# Patient Record
Sex: FEMALE | Race: WHITE | NOT HISPANIC OR LATINO | Employment: OTHER | URBAN - METROPOLITAN AREA
[De-identification: names, ages, dates, MRNs, and addresses within clinical notes are randomized per-mention and may not be internally consistent; named-entity substitution may affect disease eponyms.]

---

## 2017-01-06 RX ORDER — LISINOPRIL 40 MG/1
TABLET ORAL
Qty: 90 TABLET | Refills: 0 | Status: SHIPPED | OUTPATIENT
Start: 2017-01-06 | End: 2017-04-03 | Stop reason: SDUPTHER

## 2017-01-06 RX ORDER — ISOSORBIDE MONONITRATE 60 MG/1
TABLET, EXTENDED RELEASE ORAL
Qty: 90 TABLET | Refills: 0 | Status: SHIPPED | OUTPATIENT
Start: 2017-01-06 | End: 2017-04-03 | Stop reason: SDUPTHER

## 2017-02-17 DIAGNOSIS — E11.9 DIABETES MELLITUS TYPE 2, UNCOMPLICATED (HCC): ICD-10-CM

## 2017-02-17 RX ORDER — GLIPIZIDE 2.5 MG/1
TABLET, EXTENDED RELEASE ORAL
Qty: 60 TABLET | Refills: 1 | Status: SHIPPED | OUTPATIENT
Start: 2017-02-17 | End: 2018-04-16 | Stop reason: ALTCHOICE

## 2017-03-20 ENCOUNTER — OFFICE VISIT (OUTPATIENT)
Dept: CARDIOLOGY | Facility: CLINIC | Age: 67
End: 2017-03-20

## 2017-03-20 VITALS
DIASTOLIC BLOOD PRESSURE: 78 MMHG | WEIGHT: 184.2 LBS | HEART RATE: 45 BPM | SYSTOLIC BLOOD PRESSURE: 136 MMHG | BODY MASS INDEX: 33.9 KG/M2 | HEIGHT: 62 IN

## 2017-03-20 DIAGNOSIS — I25.5 ISCHEMIC CARDIOMYOPATHY: ICD-10-CM

## 2017-03-20 DIAGNOSIS — I44.7 LEFT BUNDLE BRANCH BLOCK (LBBB): ICD-10-CM

## 2017-03-20 DIAGNOSIS — I25.118 CORONARY ARTERY DISEASE OF NATIVE ARTERY OF NATIVE HEART WITH STABLE ANGINA PECTORIS (HCC): Primary | ICD-10-CM

## 2017-03-20 DIAGNOSIS — I65.23 BILATERAL CAROTID ARTERY STENOSIS: ICD-10-CM

## 2017-03-20 DIAGNOSIS — R00.1 SINUS BRADYCARDIA: ICD-10-CM

## 2017-03-20 DIAGNOSIS — E11.9 TYPE 2 DIABETES MELLITUS WITHOUT COMPLICATION, WITHOUT LONG-TERM CURRENT USE OF INSULIN (HCC): ICD-10-CM

## 2017-03-20 DIAGNOSIS — I10 ESSENTIAL HYPERTENSION: ICD-10-CM

## 2017-03-20 PROCEDURE — 93000 ELECTROCARDIOGRAM COMPLETE: CPT | Performed by: INTERNAL MEDICINE

## 2017-03-20 PROCEDURE — 99213 OFFICE O/P EST LOW 20 MIN: CPT | Performed by: INTERNAL MEDICINE

## 2017-03-20 RX ORDER — ATORVASTATIN CALCIUM 20 MG/1
TABLET, FILM COATED ORAL
COMMUNITY
Start: 2016-12-12

## 2017-03-20 NOTE — PROGRESS NOTES
Date of Office Visit: 2017  Encounter Provider: Sherwin Robertson MD  Place of Service: [facility] CARDIOLOGY  Patient Name: Petra Soto  :1950    Chief Complaint   Patient presents with   • Coronary Artery Disease   :     HPI: Petra Soto is a 66 y.o. female who presents today to follow up.  In , she suffered a myocardial infarction. Presumably this was due to occlusion of the left anterior descending artery. She underwent angioplasty and stenting but this failed and she then underwent coronary artery bypass grafting with a LIMA to the diagonal and a saphenous vein graft to the LAD. In , she underwent cardiac catheterization which revealed complete occlusion of the LAD as well as complete occlusion of the saphenous vein graft to the LAD. All other vessels were normal and her LIMA to the diagonal was normal as well. She has mild ischemic cardiomyopathy with an ejection fraction of 45%. She has a long standing left bundle branch block. She has chronic stable angina which is CCS class II.  Her pattern of angina has not increased in severity, frequency or quality.    When I review her old records from [place], she also has a long history of sinus bradycardia.  There is a report that states a PET was done in , but I don't see a report.  She has a reported history of a false positive Cardiolite/Myoview.    Past Medical History   Diagnosis Date   • Carotid atherosclerosis      nonosbstructive, minimal by US 3/2016 (<15% bilaterally)   • Coronary artery disease      MI , s/p PCI-LAD, f/b near immediate CABG (LIMA-diag, SVG-LAD).  Cath  with normal LM/LCx/RCA,  LAD,  SVG-LAD, patent LIMA-diag, R-L collaterals   • Diabetes mellitus    • Essential hypertension    • Hyperlipidemia    • Ischemic cardiomyopathy      mild, EF 45%   • Left bundle branch block    • Multiple URI    • Stable angina        Past Surgical History   Procedure Laterality Date   •  Hysterectomy     • Cardiac surgery       Bypass.   • Submandibular gland excision     • Coronary artery bypass graft     • Peripheral arterial stent graft         Social History     Social History   • Marital status:      Spouse name: N/A   • Number of children: N/A   • Years of education: N/A     Occupational History   • Not on file.     Social History Main Topics   • Smoking status: Never Smoker   • Smokeless tobacco: Never Used   • Alcohol use No   • Drug use: No   • Sexual activity: Not on file     Other Topics Concern   • Not on file     Social History Narrative       Family History   Problem Relation Age of Onset   • Colon cancer Mother    • Stomach cancer Mother    • Heart disease Father    • Heart failure Father    • Hypertension Sister    • Thyroid disease Sister    • Hypertension Brother        Review of Systems   HENT: Positive for hearing loss.    Cardiovascular: Positive for chest pain.   All other systems reviewed and are negative.      Allergies   Allergen Reactions   • Morphine And Related Nausea And Vomiting   • Oxycodone Nausea And Vomiting         Current Outpatient Prescriptions:   •  amLODIPine (NORVASC) 5 MG tablet, Take 1 tablet by mouth Daily., Disp: 90 tablet, Rfl: 1  •  aspirin (KATHARINE ASPIRIN) 325 MG tablet, Take 1 tablet (325 mg total) by mouth daily., Disp: 30 tablet, Rfl: 0  •  atorvastatin (LIPITOR) 20 MG tablet, TAKE ONE TABLET BY MOUTH DAILY, Disp: , Rfl:   •  Canagliflozin (INVOKANA) 100 MG tablet, Take 100 mg by mouth Daily., Disp: 20 tablet, Rfl: 0  •  GLIPIZIDE XL 2.5 MG 24 hr tablet, TAKE ONE TO TWO TABLETS BY MOUTH EVERY MORNING MONITOR FOR HYPOGLYCEMIA, Disp: 60 tablet, Rfl: 1  •  glucose blood test strip, Test Blood Sugar two to three times a day as directed , Diagnosis E11.9, Accu-Check Smartview Test Strips, Disp: 300 each, Rfl: 1  •  hydrochlorothiazide (HYDRODIURIL) 25 MG tablet, Take 1/2 PO Every Other Day., Disp: 30 tablet, Rfl: 2  •  isosorbide mononitrate  "(IMDUR) 60 MG 24 hr tablet, TAKE ONE TABLET BY MOUTH DAILY, Disp: 90 tablet, Rfl: 0  •  lisinopril (PRINIVIL,ZESTRIL) 40 MG tablet, TAKE ONE TABLET BY MOUTH DAILY, Disp: 90 tablet, Rfl: 0  •  metFORMIN (GLUCOPHAGE) 1000 MG tablet, TAKE ONE TABLET BY MOUTH TWICE A DAY WITH MEALS, Disp: 60 tablet, Rfl: 1  •  metoprolol succinate XL (TOPROL XL) 50 MG 24 hr tablet, Take 1 tablet by mouth Daily., Disp: 90 tablet, Rfl: 1  •  multivitamin (THERAGRAN) tablet tablet, Take 1 tablet by mouth daily., Disp: 30 tablet, Rfl: 0  •  nitroglycerin (NITROSTAT) 0.4 MG SL tablet, Place 1 tablet under the tongue Every 5 (Five) Minutes As Needed for chest pain. Take no more than 3 doses in 15 minutes., Disp: 30 tablet, Rfl: 0  •  SITagliptin (JANUVIA) 100 MG tablet, Take 1 tablet by mouth Daily., Disp: 28 tablet, Rfl: 0     Objective:     Vitals:    03/20/17 1041   BP: 136/78   Pulse: (!) 45   Weight: 184 lb 3.2 oz (83.6 kg)   Height: 62\" (157.5 cm)     Body mass index is 33.69 kg/(m^2).    Physical Exam   Constitutional: She is oriented to person, place, and time.   Obese   HENT:   Head: Normocephalic.   Nose: Nose normal.   Mouth/Throat: Oropharynx is clear and moist.   Eyes: Conjunctivae and EOM are normal. Pupils are equal, round, and reactive to light.   Neck: Normal range of motion.   Cannot assess for JVD due to habitus   Cardiovascular: Normal rate, regular rhythm, normal heart sounds and intact distal pulses.    No murmur heard.  Pulmonary/Chest: Effort normal.   Abdominal: Soft.   Obesity limits abdominal exam   Musculoskeletal: Normal range of motion. She exhibits no edema.   Neurological: She is alert and oriented to person, place, and time. No cranial nerve deficit.   Skin: Skin is warm and dry. No rash noted.   Psychiatric: She has a normal mood and affect. Her behavior is normal. Judgment and thought content normal.   Vitals reviewed.        ECG 12 Lead  Date/Time: 3/20/2017 3:23 PM  Performed by: RJ LUCAS " by: SHERWIN ROBERTSON   Comparison: compared with previous ECG   Similar to previous ECG  Rhythm: sinus bradycardia  Conduction: left bundle branch block  Other: no other findings  Clinical impression: abnormal ECG              Assessment:       Diagnosis Plan   1. Coronary artery disease of native artery of native heart with stable angina pectoris     2. Ischemic cardiomyopathy     3. Left bundle branch block (LBBB)     4. Sinus bradycardia     5. Essential hypertension     6. Bilateral carotid artery stenosis     7. Type 2 diabetes mellitus without complication, without long-term current use of insulin            Plan:       1.  Coronary Artery Disease  Assessment  • The patient has CCS class II - angina only during vigorous physical activity  • The patient has stable angina   • Continue atorvastatin    Subjective - Objective  • There is a history of previous coronary artery bypass graft 1999  • Current antiplatelet therapy includes aspirin 81 mg      2/3.  She has mild ICM without CHF.  Her LVEF is 45%.  Her LBBB is old.    4.  She has a long history of sinus bradycardia but is tolerating it well.  I will continue her metoprolol.    5.  Her BP is within goal.    6.  A carotid doppler in 2016 showed < 15% carotid disease bilaterally. She's medically treated.  A repeat doppler will be done in 2018.      Sincerely,       Sherwin Robertson MD

## 2017-04-03 RX ORDER — ISOSORBIDE MONONITRATE 60 MG/1
TABLET, EXTENDED RELEASE ORAL
Qty: 90 TABLET | Refills: 0 | Status: SHIPPED | OUTPATIENT
Start: 2017-04-03

## 2017-04-03 RX ORDER — LISINOPRIL 40 MG/1
TABLET ORAL
Qty: 90 TABLET | Refills: 0 | Status: SHIPPED | OUTPATIENT
Start: 2017-04-03

## 2017-05-26 RX ORDER — NITROGLYCERIN 0.4 MG/1
TABLET SUBLINGUAL
Qty: 25 TABLET | Refills: 0 | OUTPATIENT
Start: 2017-05-26

## 2017-06-27 RX ORDER — ISOSORBIDE MONONITRATE 60 MG/1
TABLET, EXTENDED RELEASE ORAL
Qty: 30 TABLET | Refills: 0 | OUTPATIENT
Start: 2017-06-27

## 2017-07-03 RX ORDER — LISINOPRIL 40 MG/1
TABLET ORAL
Qty: 30 TABLET | Refills: 0 | OUTPATIENT
Start: 2017-07-03

## 2017-10-04 RX ORDER — HYDROCHLOROTHIAZIDE 25 MG/1
TABLET ORAL
Qty: 30 TABLET | Refills: 1 | OUTPATIENT
Start: 2017-10-04

## 2018-01-02 RX ORDER — METOPROLOL SUCCINATE 50 MG/1
TABLET, EXTENDED RELEASE ORAL
Qty: 30 TABLET | Refills: 0 | OUTPATIENT
Start: 2018-01-02

## 2018-01-08 RX ORDER — METOPROLOL SUCCINATE 50 MG/1
TABLET, EXTENDED RELEASE ORAL
Qty: 30 TABLET | Refills: 0 | OUTPATIENT
Start: 2018-01-08

## 2018-04-16 ENCOUNTER — OFFICE VISIT (OUTPATIENT)
Dept: CARDIOLOGY | Facility: CLINIC | Age: 68
End: 2018-04-16

## 2018-04-16 VITALS
HEART RATE: 45 BPM | BODY MASS INDEX: 35.96 KG/M2 | WEIGHT: 195.4 LBS | SYSTOLIC BLOOD PRESSURE: 128 MMHG | HEIGHT: 62 IN | DIASTOLIC BLOOD PRESSURE: 78 MMHG

## 2018-04-16 DIAGNOSIS — R01.1 HEART MURMUR: ICD-10-CM

## 2018-04-16 DIAGNOSIS — I25.5 ISCHEMIC CARDIOMYOPATHY: ICD-10-CM

## 2018-04-16 DIAGNOSIS — I25.708 CORONARY ARTERY DISEASE OF BYPASS GRAFT OF NATIVE HEART WITH STABLE ANGINA PECTORIS (HCC): Primary | ICD-10-CM

## 2018-04-16 DIAGNOSIS — I65.23 BILATERAL CAROTID ARTERY STENOSIS: ICD-10-CM

## 2018-04-16 DIAGNOSIS — R00.1 SINUS BRADYCARDIA: ICD-10-CM

## 2018-04-16 DIAGNOSIS — I10 ESSENTIAL HYPERTENSION: ICD-10-CM

## 2018-04-16 DIAGNOSIS — I44.7 LEFT BUNDLE BRANCH BLOCK (LBBB): ICD-10-CM

## 2018-04-16 PROCEDURE — 93000 ELECTROCARDIOGRAM COMPLETE: CPT | Performed by: INTERNAL MEDICINE

## 2018-04-16 PROCEDURE — 99214 OFFICE O/P EST MOD 30 MIN: CPT | Performed by: INTERNAL MEDICINE

## 2018-04-16 RX ORDER — GLIPIZIDE 10 MG/1
10 TABLET ORAL 2 TIMES DAILY
COMMUNITY

## 2018-04-16 RX ORDER — ESTRADIOL 0.1 MG/G
CREAM VAGINAL 3 TIMES WEEKLY
COMMUNITY

## 2018-04-16 NOTE — PROGRESS NOTES
Date of Office Visit: 2018  Encounter Provider: Sherwin Robertson MD  Place of Service: [facility] CARDIOLOGY  Patient Name: Petra Soto  :1950    Chief Complaint   Patient presents with   • Coronary Artery Disease   :     HPI: Petra Soto is a 67 y.o. female who presents today to follow up.  In , she suffered a myocardial infarction. Presumably this was due to occlusion of the left anterior descending artery. She underwent angioplasty and stenting but this failed and she then underwent coronary artery bypass grafting with a LIMA to the diagonal and a saphenous vein graft to the LAD. In , she underwent cardiac catheterization which revealed complete occlusion of the LAD as well as complete occlusion of the saphenous vein graft to the LAD. All other vessels were normal and her LIMA to the diagonal was normal as well. She has mild ischemic cardiomyopathy with an ejection fraction of 45%. She has a long standing left bundle branch block. She has chronic stable angina which is CCS class II.  Her pattern of angina has not increased in severity, frequency or quality.  There was a note from [place] that states a PET was done in , but I don't see a report.    She also has a long history of sinus bradycardia.   She checks her vitals at home and her pulse is usually 45-55 bpm, and her systolic pressure is in the 120s.      Past Medical History:   Diagnosis Date   • Carotid atherosclerosis     nonosbstructive, minimal by US 3/2016 (<15% bilaterally)   • Coronary artery disease     MI , s/p PCI-LAD vs diagonal (3*13mm Duet), f/b near immediate CABG (LIMA-diag, SVG-LAD).  Cath  with normal LM/LCx/RCA,  LAD,  SVG-LAD, patent LIMA-diag, R-L collaterals   • Diabetes mellitus    • Essential hypertension    • Hyperlipidemia    • Ischemic cardiomyopathy     mild, EF 45%   • Left bundle branch block    • Multiple URI    • Stable angina        Past Surgical History:  "  Procedure Laterality Date   • BLADDER SURGERY  12/2017   • CARDIAC SURGERY      Bypass.   • CATARACT EXTRACTION, BILATERAL     • CORONARY ARTERY BYPASS GRAFT     • HYSTERECTOMY     • PERIPHERAL ARTERIAL STENT GRAFT     • SUBMANDIBULAR GLAND EXCISION         Social History     Social History   • Marital status:      Spouse name: N/A   • Number of children: N/A   • Years of education: N/A     Occupational History   • Not on file.     Social History Main Topics   • Smoking status: Never Smoker   • Smokeless tobacco: Never Used      Comment: caffeine use: 'not often\"   • Alcohol use No   • Drug use: No   • Sexual activity: Not on file     Other Topics Concern   • Not on file     Social History Narrative   • No narrative on file       Family History   Problem Relation Age of Onset   • Colon cancer Mother    • Stomach cancer Mother    • Heart disease Father    • Heart failure Father    • Hypertension Sister    • Thyroid disease Sister    • Hypertension Brother        Review of Systems   Constitution: Negative for malaise/fatigue.   HENT: Positive for hearing loss.    Cardiovascular: Positive for chest pain. Negative for irregular heartbeat and leg swelling.   Respiratory: Negative for shortness of breath.    Neurological: Negative for dizziness and light-headedness.   All other systems reviewed and are negative.      Allergies   Allergen Reactions   • Morphine And Related Nausea And Vomiting   • Oxycodone Nausea And Vomiting         Current Outpatient Prescriptions:   •  amLODIPine (NORVASC) 5 MG tablet, Take 1 tablet by mouth Daily., Disp: 90 tablet, Rfl: 1  •  aspirin (KATHARINE ASPIRIN) 325 MG tablet, Take 1 tablet (325 mg total) by mouth daily., Disp: 30 tablet, Rfl: 0  •  atorvastatin (LIPITOR) 20 MG tablet, TAKE ONE TABLET BY MOUTH DAILY, Disp: , Rfl:   •  estradiol (ESTRACE) 0.1 MG/GM vaginal cream, Insert 2 g into the vagina Daily., Disp: , Rfl:   •  glipiZIDE (GLUCOTROL) 10 MG tablet, Take 10 mg by mouth " "Daily., Disp: , Rfl:   •  glucose blood test strip, Test Blood Sugar two to three times a day as directed , Diagnosis E11.9, Accu-Check Smartview Test Strips, Disp: 300 each, Rfl: 1  •  hydrochlorothiazide (HYDRODIURIL) 25 MG tablet, Take 1/2 PO Every Other Day., Disp: 30 tablet, Rfl: 2  •  isosorbide mononitrate (IMDUR) 60 MG 24 hr tablet, TAKE ONE TABLET BY MOUTH DAILY, Disp: 90 tablet, Rfl: 0  •  lisinopril (PRINIVIL,ZESTRIL) 40 MG tablet, TAKE ONE TABLET BY MOUTH DAILY, Disp: 90 tablet, Rfl: 0  •  metFORMIN (GLUCOPHAGE) 1000 MG tablet, TAKE ONE TABLET BY MOUTH TWICE A DAY WITH MEALS, Disp: 60 tablet, Rfl: 1  •  metoprolol succinate XL (TOPROL XL) 50 MG 24 hr tablet, Take 1 tablet by mouth Daily., Disp: 90 tablet, Rfl: 1  •  multivitamin (THERAGRAN) tablet tablet, Take 1 tablet by mouth daily., Disp: 30 tablet, Rfl: 0  •  nitroglycerin (NITROSTAT) 0.4 MG SL tablet, Place 1 tablet under the tongue Every 5 (Five) Minutes As Needed for chest pain. Take no more than 3 doses in 15 minutes., Disp: 30 tablet, Rfl: 0     Objective:     Vitals:    04/16/18 1012 04/16/18 1027   BP: 138/82 128/78   BP Location: Left arm    Pulse: (!) 45    Weight: 88.6 kg (195 lb 6.4 oz)    Height: 157.5 cm (62\")      Body mass index is 35.74 kg/m².    Physical Exam   Constitutional: She is oriented to person, place, and time.   Obese   HENT:   Head: Normocephalic.   Nose: Nose normal.   Mouth/Throat: Oropharynx is clear and moist.   Eyes: Conjunctivae and EOM are normal. Pupils are equal, round, and reactive to light.   Neck: Normal range of motion.   Cannot assess for JVD due to habitus   Cardiovascular: Normal rate, regular rhythm and intact distal pulses.    Murmur heard.   Systolic murmur is present with a grade of 2/6  at the upper right sternal border  Pulmonary/Chest: Effort normal.   Abdominal: Soft.   Obesity limits abdominal exam   Musculoskeletal: Normal range of motion. She exhibits no edema.   Neurological: She is alert and " oriented to person, place, and time. No cranial nerve deficit.   Skin: Skin is warm and dry. No rash noted.   Psychiatric: She has a normal mood and affect. Her behavior is normal. Judgment and thought content normal.   Vitals reviewed.        ECG 12 Lead  Date/Time: 4/16/2018 10:30 AM  Performed by: SHERWIN ROBERTSON  Authorized by: SHERWIN ROBERTSON   Comparison: compared with previous ECG   Similar to previous ECG  Rhythm: sinus bradycardia  Conduction: left bundle branch block  Other: no other findings  Clinical impression: abnormal ECG              Assessment:       Diagnosis Plan   1. Coronary artery disease of bypass graft of native heart with stable angina pectoris     2. Ischemic cardiomyopathy  Adult Transthoracic Echo Complete W/ Cont if Necessary Per Protocol   3. Sinus bradycardia     4. Left bundle branch block (LBBB)     5. Essential hypertension     6. Bilateral carotid artery stenosis  Duplex Carotid Ultrasound CAR   7. Heart murmur            Plan:       1.  Coronary Artery Disease  Assessment  • The patient has CCS class II - angina only during vigorous physical activity  • The patient has stable angina   • Continue atorvastatin.    Subjective - Objective  • There is a history of previous coronary artery bypass graft 1999  • Current antiplatelet therapy includes aspirin 81 mg      2.  She has mild ICM without CHF.  Her LVEF is 45%.      3/4.  She has a long standing LBBB and known sinus bradycardia.  As she is tolerasting it well, no changes were made.     5.  Her BP is within goal.    6.  A carotid doppler in 2016 showed < 15% carotid disease bilaterally. She's medically treated.  A repeat doppler will be performed.    7.  She has a RUSB murmur I haven't noted before.  I suspect this is aortic sclerosis; I'll check an echocardiogram.     Sincerely,       Sherwin Robertson MD

## 2018-04-25 ENCOUNTER — HOSPITAL ENCOUNTER (OUTPATIENT)
Dept: CARDIOLOGY | Facility: HOSPITAL | Age: 68
Discharge: HOME | End: 2018-04-25
Attending: INTERNAL MEDICINE | Admitting: INTERNAL MEDICINE

## 2018-04-25 ENCOUNTER — HOSPITAL ENCOUNTER (OUTPATIENT)
Dept: CARDIOLOGY | Facility: HOSPITAL | Age: 68
Discharge: HOME | End: 2018-04-25
Attending: INTERNAL MEDICINE

## 2018-04-25 VITALS
HEART RATE: 43 BPM | HEIGHT: 62 IN | DIASTOLIC BLOOD PRESSURE: 70 MMHG | SYSTOLIC BLOOD PRESSURE: 128 MMHG | WEIGHT: 195 LBS | BODY MASS INDEX: 35.88 KG/M2

## 2018-04-25 DIAGNOSIS — I65.23 BILATERAL CAROTID ARTERY STENOSIS: ICD-10-CM

## 2018-04-25 DIAGNOSIS — I25.5 ISCHEMIC CARDIOMYOPATHY: ICD-10-CM

## 2018-04-25 PROCEDURE — 93880 EXTRACRANIAL BILAT STUDY: CPT | Performed by: INTERNAL MEDICINE

## 2018-04-25 PROCEDURE — 93306 TTE W/DOPPLER COMPLETE: CPT

## 2018-04-25 PROCEDURE — 25010000002 PERFLUTREN (DEFINITY) 8.476 MG IN SODIUM CHLORIDE 0.9 % 10 ML INJECTION: Performed by: INTERNAL MEDICINE

## 2018-04-25 PROCEDURE — 0399T HC MYOCARDL STRAIN IMAG QUAN ASSMT PER SESS: CPT

## 2018-04-25 PROCEDURE — 93880 EXTRACRANIAL BILAT STUDY: CPT

## 2018-04-25 PROCEDURE — 0399T ADULT TRANSTHORACIC ECHO COMPLETE W/ CONT IF NECESSARY PER PROTOCOL: CPT | Performed by: INTERNAL MEDICINE

## 2018-04-25 PROCEDURE — 93306 TTE W/DOPPLER COMPLETE: CPT | Performed by: INTERNAL MEDICINE

## 2018-04-25 RX ADMIN — PERFLUTREN 1.5 ML: 6.52 INJECTION, SUSPENSION INTRAVENOUS at 09:31

## 2018-04-26 LAB
BH CV ECHO MEAS - ACS: 2.1 CM
BH CV ECHO MEAS - AO MAX PG (FULL): 4.5 MMHG
BH CV ECHO MEAS - AO MAX PG: 9.1 MMHG
BH CV ECHO MEAS - AO MEAN PG (FULL): 2.2 MMHG
BH CV ECHO MEAS - AO MEAN PG: 4.2 MMHG
BH CV ECHO MEAS - AO ROOT AREA (BSA CORRECTED): 2
BH CV ECHO MEAS - AO ROOT AREA: 11.1 CM^2
BH CV ECHO MEAS - AO ROOT DIAM: 3.8 CM
BH CV ECHO MEAS - AO V2 MAX: 150.8 CM/SEC
BH CV ECHO MEAS - AO V2 MEAN: 92.6 CM/SEC
BH CV ECHO MEAS - AO V2 VTI: 33.6 CM
BH CV ECHO MEAS - AVA(I,A): 3.2 CM^2
BH CV ECHO MEAS - AVA(I,D): 3.2 CM^2
BH CV ECHO MEAS - AVA(V,A): 3 CM^2
BH CV ECHO MEAS - AVA(V,D): 3 CM^2
BH CV ECHO MEAS - BSA(HAYCOCK): 2 M^2
BH CV ECHO MEAS - BSA: 1.9 M^2
BH CV ECHO MEAS - BZI_BMI: 35.7 KILOGRAMS/M^2
BH CV ECHO MEAS - BZI_METRIC_HEIGHT: 157.5 CM
BH CV ECHO MEAS - BZI_METRIC_WEIGHT: 88.5 KG
BH CV ECHO MEAS - CONTRAST EF (2CH): 42.9 ML/M^2
BH CV ECHO MEAS - CONTRAST EF 4CH: 41.5 ML/M^2
BH CV ECHO MEAS - EDV(MOD-SP2): 98 ML
BH CV ECHO MEAS - EDV(MOD-SP4): 94 ML
BH CV ECHO MEAS - EDV(TEICH): 145.7 ML
BH CV ECHO MEAS - EF(CUBED): 80.6 %
BH CV ECHO MEAS - EF(MOD-BP): 43 %
BH CV ECHO MEAS - EF(MOD-SP2): 42.9 %
BH CV ECHO MEAS - EF(MOD-SP4): 41.5 %
BH CV ECHO MEAS - EF(TEICH): 72.5 %
BH CV ECHO MEAS - ESV(MOD-SP2): 56 ML
BH CV ECHO MEAS - ESV(MOD-SP4): 55 ML
BH CV ECHO MEAS - ESV(TEICH): 40.1 ML
BH CV ECHO MEAS - FS: 42.1 %
BH CV ECHO MEAS - IVS/LVPW: 1.1
BH CV ECHO MEAS - IVSD: 1.2 CM
BH CV ECHO MEAS - LAT PEAK E' VEL: 7 CM/SEC
BH CV ECHO MEAS - LV DIASTOLIC VOL/BSA (35-75): 49.7 ML/M^2
BH CV ECHO MEAS - LV MASS(C)D: 255.2 GRAMS
BH CV ECHO MEAS - LV MASS(C)DI: 135 GRAMS/M^2
BH CV ECHO MEAS - LV MAX PG: 4.6 MMHG
BH CV ECHO MEAS - LV MEAN PG: 2 MMHG
BH CV ECHO MEAS - LV SYSTOLIC VOL/BSA (12-30): 29.1 ML/M^2
BH CV ECHO MEAS - LV V1 MAX: 107.7 CM/SEC
BH CV ECHO MEAS - LV V1 MEAN: 62.7 CM/SEC
BH CV ECHO MEAS - LV V1 VTI: 25.6 CM
BH CV ECHO MEAS - LVIDD: 5.5 CM
BH CV ECHO MEAS - LVIDS: 3.2 CM
BH CV ECHO MEAS - LVLD AP2: 7.4 CM
BH CV ECHO MEAS - LVLD AP4: 7.5 CM
BH CV ECHO MEAS - LVLS AP2: 7.1 CM
BH CV ECHO MEAS - LVLS AP4: 6.7 CM
BH CV ECHO MEAS - LVOT AREA (M): 4.2 CM^2
BH CV ECHO MEAS - LVOT AREA: 4.2 CM^2
BH CV ECHO MEAS - LVOT DIAM: 2.3 CM
BH CV ECHO MEAS - LVPWD: 1.1 CM
BH CV ECHO MEAS - MED PEAK E' VEL: 6 CM/SEC
BH CV ECHO MEAS - MR MAX PG: 42.1 MMHG
BH CV ECHO MEAS - MR MAX VEL: 324.6 CM/SEC
BH CV ECHO MEAS - MV A DUR: 0.11 SEC
BH CV ECHO MEAS - MV A MAX VEL: 94.6 CM/SEC
BH CV ECHO MEAS - MV DEC SLOPE: 277.5 CM/SEC^2
BH CV ECHO MEAS - MV DEC TIME: 0.26 SEC
BH CV ECHO MEAS - MV E MAX VEL: 58.7 CM/SEC
BH CV ECHO MEAS - MV E/A: 0.62
BH CV ECHO MEAS - MV MAX PG: 4.8 MMHG
BH CV ECHO MEAS - MV MEAN PG: 1.5 MMHG
BH CV ECHO MEAS - MV P1/2T MAX VEL: 69.8 CM/SEC
BH CV ECHO MEAS - MV P1/2T: 73.7 MSEC
BH CV ECHO MEAS - MV V2 MAX: 109.8 CM/SEC
BH CV ECHO MEAS - MV V2 MEAN: 56.2 CM/SEC
BH CV ECHO MEAS - MV V2 VTI: 42.7 CM
BH CV ECHO MEAS - MVA P1/2T LCG: 3.1 CM^2
BH CV ECHO MEAS - MVA(P1/2T): 3 CM^2
BH CV ECHO MEAS - MVA(VTI): 2.5 CM^2
BH CV ECHO MEAS - PA ACC TIME: 0.13 SEC
BH CV ECHO MEAS - PA MAX PG (FULL): 2.8 MMHG
BH CV ECHO MEAS - PA MAX PG: 4.7 MMHG
BH CV ECHO MEAS - PA PR(ACCEL): 18.8 MMHG
BH CV ECHO MEAS - PA V2 MAX: 108.6 CM/SEC
BH CV ECHO MEAS - PULM A REVS DUR: 0.11 SEC
BH CV ECHO MEAS - PULM A REVS VEL: 25.2 CM/SEC
BH CV ECHO MEAS - PULM DIAS VEL: 28.1 CM/SEC
BH CV ECHO MEAS - PULM S/D: 1.6
BH CV ECHO MEAS - PULM SYS VEL: 45.1 CM/SEC
BH CV ECHO MEAS - PVA(V,A): 2.7 CM^2
BH CV ECHO MEAS - PVA(V,D): 2.7 CM^2
BH CV ECHO MEAS - QP/QS: 0.71
BH CV ECHO MEAS - RV MAX PG: 1.9 MMHG
BH CV ECHO MEAS - RV MEAN PG: 1 MMHG
BH CV ECHO MEAS - RV V1 MAX: 69.4 CM/SEC
BH CV ECHO MEAS - RV V1 MEAN: 45.4 CM/SEC
BH CV ECHO MEAS - RV V1 VTI: 17.9 CM
BH CV ECHO MEAS - RVOT AREA: 4.3 CM^2
BH CV ECHO MEAS - RVOT DIAM: 2.3 CM
BH CV ECHO MEAS - SI(AO): 197.7 ML/M^2
BH CV ECHO MEAS - SI(CUBED): 69.8 ML/M^2
BH CV ECHO MEAS - SI(LVOT): 57.4 ML/M^2
BH CV ECHO MEAS - SI(MOD-SP2): 22.2 ML/M^2
BH CV ECHO MEAS - SI(MOD-SP4): 20.6 ML/M^2
BH CV ECHO MEAS - SI(TEICH): 55.9 ML/M^2
BH CV ECHO MEAS - SUP REN AO DIAM: 1.6 CM
BH CV ECHO MEAS - SV(AO): 373.9 ML
BH CV ECHO MEAS - SV(CUBED): 132 ML
BH CV ECHO MEAS - SV(LVOT): 108.6 ML
BH CV ECHO MEAS - SV(MOD-SP2): 42 ML
BH CV ECHO MEAS - SV(MOD-SP4): 39 ML
BH CV ECHO MEAS - SV(RVOT): 77.1 ML
BH CV ECHO MEAS - SV(TEICH): 105.7 ML
BH CV ECHO MEAS - TAPSE (>1.6): 2.4 CM2
BH CV ECHO MEASUREMENTS AVERAGE E/E' RATIO: 9.03
BH CV XLRA - RV BASE: 3.7 CM
BH CV XLRA - TDI S': 10 CM/SEC
LEFT ATRIUM VOLUME INDEX: 21 ML/M2
MAXIMAL PREDICTED HEART RATE: 155 BPM
STRESS TARGET HR: 132 BPM

## 2018-04-27 LAB
BH CV ECHO MEAS - BSA(HAYCOCK): 2 M^2
BH CV ECHO MEAS - BSA: 1.9 M^2
BH CV ECHO MEAS - BZI_BMI: 35.7 KILOGRAMS/M^2
BH CV ECHO MEAS - BZI_METRIC_HEIGHT: 157.5 CM
BH CV ECHO MEAS - BZI_METRIC_WEIGHT: 88.5 KG
BH CV XLRA MEAS LEFT DIST CCA EDV: -16.7 CM/SEC
BH CV XLRA MEAS LEFT DIST CCA PSV: -47.2 CM/SEC
BH CV XLRA MEAS LEFT DIST ICA EDV: -24.3 CM/SEC
BH CV XLRA MEAS LEFT DIST ICA PSV: -44.7 CM/SEC
BH CV XLRA MEAS LEFT ICA/CCA RATIO: 0.9
BH CV XLRA MEAS LEFT MID CCA EDV: -13.3 CM/SEC
BH CV XLRA MEAS LEFT MID CCA PSV: -54.5 CM/SEC
BH CV XLRA MEAS LEFT MID ICA EDV: -24.3 CM/SEC
BH CV XLRA MEAS LEFT MID ICA PSV: -53.5 CM/SEC
BH CV XLRA MEAS LEFT PROX ECA PSV: 57.5 CM/SEC
BH CV XLRA MEAS LEFT PROX ICA EDV: -30 CM/SEC
BH CV XLRA MEAS LEFT PROX ICA PSV: -57.5 CM/SEC
BH CV XLRA MEAS LEFT PROX SCLA PSV: 97.8 CM/SEC
BH CV XLRA MEAS LEFT VERTEBRAL A PSV: -46.7 CM/SEC
BH CV XLRA MEAS RIGHT DIST CCA EDV: -18 CM/SEC
BH CV XLRA MEAS RIGHT DIST CCA PSV: -53.4 CM/SEC
BH CV XLRA MEAS RIGHT ICA/CCA RATIO: 1.8
BH CV XLRA MEAS RIGHT MID CCA EDV: 16.2 CM/SEC
BH CV XLRA MEAS RIGHT MID CCA PSV: 63.4 CM/SEC
BH CV XLRA MEAS RIGHT MID ICA EDV: -9.2 CM/SEC
BH CV XLRA MEAS RIGHT MID ICA PSV: -27.7 CM/SEC
BH CV XLRA MEAS RIGHT PROX ECA PSV: -70.6 CM/SEC
BH CV XLRA MEAS RIGHT PROX ICA EDV: -14.1 CM/SEC
BH CV XLRA MEAS RIGHT PROX ICA PSV: -36.1 CM/SEC
BH CV XLRA MEAS RIGHT PROX SCLA PSV: 99.1 CM/SEC
BH CV XLRA MEAS RIGHT VERTEBRAL A PSV: -23.6 CM/SEC

## 2018-12-06 RX ORDER — HYDROCHLOROTHIAZIDE 25 MG/1
TABLET ORAL
Qty: 30 TABLET | Refills: 1 | OUTPATIENT
Start: 2018-12-06

## 2019-04-17 ENCOUNTER — OFFICE VISIT (OUTPATIENT)
Dept: CARDIOLOGY | Facility: CLINIC | Age: 69
End: 2019-04-17

## 2019-04-17 VITALS
DIASTOLIC BLOOD PRESSURE: 82 MMHG | BODY MASS INDEX: 35.96 KG/M2 | SYSTOLIC BLOOD PRESSURE: 134 MMHG | WEIGHT: 195.4 LBS | HEIGHT: 62 IN | HEART RATE: 46 BPM

## 2019-04-17 DIAGNOSIS — R00.1 SINUS BRADYCARDIA: ICD-10-CM

## 2019-04-17 DIAGNOSIS — I44.7 LEFT BUNDLE BRANCH BLOCK (LBBB): ICD-10-CM

## 2019-04-17 DIAGNOSIS — I25.708 CORONARY ARTERY DISEASE OF BYPASS GRAFT OF NATIVE HEART WITH STABLE ANGINA PECTORIS (HCC): Primary | ICD-10-CM

## 2019-04-17 DIAGNOSIS — I65.23 BILATERAL CAROTID ARTERY STENOSIS: ICD-10-CM

## 2019-04-17 DIAGNOSIS — I10 ESSENTIAL HYPERTENSION: ICD-10-CM

## 2019-04-17 DIAGNOSIS — I25.5 ISCHEMIC CARDIOMYOPATHY: ICD-10-CM

## 2019-04-17 PROCEDURE — 93000 ELECTROCARDIOGRAM COMPLETE: CPT | Performed by: INTERNAL MEDICINE

## 2019-04-17 PROCEDURE — 99213 OFFICE O/P EST LOW 20 MIN: CPT | Performed by: INTERNAL MEDICINE

## 2019-04-17 RX ORDER — NITROGLYCERIN 0.4 MG/1
0.4 TABLET SUBLINGUAL
Qty: 30 TABLET | Refills: 0 | Status: SHIPPED | OUTPATIENT
Start: 2019-04-17 | End: 2019-10-17 | Stop reason: SDUPTHER

## 2019-04-17 NOTE — PROGRESS NOTES
Date of Office Visit: 2019  Encounter Provider: Sherwin Robertson MD  Place of Service: [facility] CARDIOLOGY  Patient Name: Petra Soto  :1950    Chief Complaint   Patient presents with   • Coronary Artery Disease   :     HPI: Petra Soto is a 68 y.o. female who presents today to follow up.      In , she suffered a myocardial infarction. Presumably this was due to occlusion of the left anterior descending artery. She underwent angioplasty and stenting but this failed and she then underwent coronary artery bypass grafting with a LIMA to the diagonal and a saphenous vein graft to the LAD. In , she underwent cardiac catheterization which revealed complete occlusion of the LAD as well as complete occlusion of the saphenous vein graft to the LAD. All other vessels were normal and her LIMA to the diagonal was normal as well. She has mild ischemic cardiomyopathy with an ejection fraction of 45%. She has a long standing left bundle branch block. She has chronic stable angina which is CCS class II.  Her pattern of angina has not increased in severity, frequency or quality.  There was a note from [place] that states a PET was done in , but I don't see a report.    She also has a long history of sinus bradycardia.   She checks her vitals at home and her pulse is usually 45-55 bpm, and her systolic pressure is in the 120s.  She has mild exertional dyspnea which is stable.  She denies palpitations, lightheadedness, syncope, edema, or orthopnea.     Past Medical History:   Diagnosis Date   • Carotid atherosclerosis     nonosbstructive, minimal by US 3/2016 (<15% bilaterally)   • Coronary artery disease     MI , s/p PCI-LAD vs diagonal (3*13mm Duet), f/b near immediate CABG (LIMA-diag, SVG-LAD).  Cath  with normal LM/LCx/RCA,  LAD,  SVG-LAD, patent LIMA-diag, R-L collaterals   • Diabetes mellitus (CMS/HCC)    • Essential hypertension    • Hyperlipidemia    •  "Ischemic cardiomyopathy     mild, EF 45%   • Left bundle branch block    • Multiple URI    • Stable angina (CMS/HCC)        Past Surgical History:   Procedure Laterality Date   • BLADDER SURGERY  12/2017   • CARDIAC SURGERY      Bypass.   • CATARACT EXTRACTION, BILATERAL     • CORONARY ARTERY BYPASS GRAFT     • HYSTERECTOMY     • PERIPHERAL ARTERIAL STENT GRAFT     • SUBMANDIBULAR GLAND EXCISION         Social History     Socioeconomic History   • Marital status:      Spouse name: Not on file   • Number of children: Not on file   • Years of education: Not on file   • Highest education level: Not on file   Tobacco Use   • Smoking status: Never Smoker   • Smokeless tobacco: Never Used   • Tobacco comment: caffeine use: 'not often\"   Substance and Sexual Activity   • Alcohol use: No   • Drug use: No       Family History   Problem Relation Age of Onset   • Colon cancer Mother    • Stomach cancer Mother    • Heart disease Father    • Heart failure Father    • Hypertension Sister    • Thyroid disease Sister    • Hypertension Brother        Review of Systems   Constitution: Negative for malaise/fatigue.   HENT: Positive for hearing loss.    Cardiovascular: Positive for dyspnea on exertion. Negative for irregular heartbeat and leg swelling.   Respiratory: Negative for shortness of breath.    Neurological: Negative for dizziness and light-headedness.   All other systems reviewed and are negative.      Allergies   Allergen Reactions   • Morphine And Related Nausea And Vomiting   • Oxycodone Nausea And Vomiting         Current Outpatient Medications:   •  amLODIPine (NORVASC) 5 MG tablet, Take 1 tablet by mouth Daily., Disp: 90 tablet, Rfl: 1  •  aspirin (KATHARINE ASPIRIN) 325 MG tablet, Take 1 tablet (325 mg total) by mouth daily., Disp: 30 tablet, Rfl: 0  •  atorvastatin (LIPITOR) 20 MG tablet, TAKE ONE TABLET BY MOUTH DAILY, Disp: , Rfl:   •  estradiol (ESTRACE) 0.1 MG/GM vaginal cream, Insert 2 g into the vagina " "Daily., Disp: , Rfl:   •  glipiZIDE (GLUCOTROL) 10 MG tablet, Take 10 mg by mouth Daily., Disp: , Rfl:   •  glucose blood test strip, Test Blood Sugar two to three times a day as directed , Diagnosis E11.9, Accu-Check Smartview Test Strips, Disp: 300 each, Rfl: 1  •  hydrochlorothiazide (HYDRODIURIL) 25 MG tablet, Take 1/2 PO Every Other Day., Disp: 30 tablet, Rfl: 2  •  isosorbide mononitrate (IMDUR) 60 MG 24 hr tablet, TAKE ONE TABLET BY MOUTH DAILY, Disp: 90 tablet, Rfl: 0  •  lisinopril (PRINIVIL,ZESTRIL) 40 MG tablet, TAKE ONE TABLET BY MOUTH DAILY, Disp: 90 tablet, Rfl: 0  •  metFORMIN (GLUCOPHAGE) 1000 MG tablet, TAKE ONE TABLET BY MOUTH TWICE A DAY WITH MEALS, Disp: 60 tablet, Rfl: 1  •  metoprolol succinate XL (TOPROL XL) 50 MG 24 hr tablet, Take 1 tablet by mouth Daily., Disp: 90 tablet, Rfl: 1  •  multivitamin (THERAGRAN) tablet tablet, Take 1 tablet by mouth daily., Disp: 30 tablet, Rfl: 0  •  nitroglycerin (NITROSTAT) 0.4 MG SL tablet, Place 1 tablet under the tongue Every 5 (Five) Minutes As Needed for Chest Pain. Take no more than 3 doses in 15 minutes., Disp: 30 tablet, Rfl: 0     Objective:     Vitals:    04/17/19 1019   BP: 134/82   BP Location: Left arm   Pulse: (!) 46   Weight: 88.6 kg (195 lb 6.4 oz)   Height: 157.5 cm (62\")     Body mass index is 35.74 kg/m².    Physical Exam   Constitutional: She is oriented to person, place, and time.   Obese   HENT:   Head: Normocephalic.   Nose: Nose normal.   Mouth/Throat: Oropharynx is clear and moist.   Eyes: Conjunctivae and EOM are normal. Pupils are equal, round, and reactive to light.   Neck: Normal range of motion.   Cannot assess for JVD due to habitus   Cardiovascular: Normal rate, regular rhythm and intact distal pulses.   Murmur heard.   Systolic murmur is present with a grade of 2/6 at the upper right sternal border.  Pulmonary/Chest: Effort normal.   Abdominal: Soft.   Obesity limits abdominal exam   Musculoskeletal: Normal range of motion. " She exhibits no edema.   Neurological: She is alert and oriented to person, place, and time. No cranial nerve deficit.   Skin: Skin is warm and dry. No rash noted.   Psychiatric: She has a normal mood and affect. Her behavior is normal. Judgment and thought content normal.   Vitals reviewed.        ECG 12 Lead  Date/Time: 4/17/2019 11:26 AM  Performed by: Sherwin Robertson MD  Authorized by: Sherwin Robertson MD   Comparison: compared with previous ECG   Similar to previous ECG  Rhythm: sinus bradycardia  Conduction: left bundle branch block  Other: no other findings    Clinical impression: abnormal EKG              Assessment:       Diagnosis Plan   1. Coronary artery disease of bypass graft of native heart with stable angina pectoris (CMS/HCC)     2. Ischemic cardiomyopathy     3. Left bundle branch block (LBBB)     4. Sinus bradycardia     5. Essential hypertension     6. Bilateral carotid artery stenosis            Plan:       1.  Coronary Artery Disease  Assessment  • The patient has CCS class II - angina only during vigorous physical activity  • The patient has stable angina   • Continue atorvastatin.    Subjective - Objective  • There is a history of previous coronary artery bypass graft 1999  • Current antiplatelet therapy includes aspirin 81 mg      2.  She has mild ICM without CHF.  Her LVEF is 45%; this was stable by TTE in 2018.      3/4.  She has a long standing LBBB and known sinus bradycardia.  As she is tolerating it well, no changes were made.     5.  Her BP is within goal.    6.  A carotid doppler in 2016 showed < 15% carotid disease bilaterally. A repeat study in 2018 showed no evidence of carotid disease.     Sincerely,       Sherwin Robertson MD

## 2019-09-19 ENCOUNTER — TELEPHONE (OUTPATIENT)
Dept: CARDIOLOGY | Facility: CLINIC | Age: 69
End: 2019-09-19

## 2019-09-19 NOTE — TELEPHONE ENCOUNTER
Pt called stating that on Nov 14 she is having laparoscopic bladder procedure to place mesh to pull her bladder back up and is needing cardiac clearance.  Surgeon Dr. Ivory Tee [phone]    She is on Aspirin 325 mg daily    Last seen by you on 4/17/19

## 2019-09-19 NOTE — TELEPHONE ENCOUNTER
Date of Office Visit:  2019  Encounter Provider:  Sherwin Robertson MD  Place of Service:  [facility] CARDIOLOGY  Patient Name: Petra Soto  :  1950        Dear Dr Tee,     Mrs Soto has stable CAD s/p CABG . She has low normal left ventricular systolic function (EF 45%) without CHF.  She has a left bundle branch block.    She is at low risk of major adverse CV events with intermediate risk surgery.  She does not require testing at this time.     Please contact our office with any questions or concerns. As always, it has been a pleasure to participate in your patient's care.      Sherwin Robertson MD  [facility]

## 2019-10-17 RX ORDER — NITROGLYCERIN 0.4 MG/1
0.4 TABLET SUBLINGUAL
Qty: 30 TABLET | Refills: 0 | Status: SHIPPED | OUTPATIENT
Start: 2019-10-17 | End: 2019-11-10 | Stop reason: SDUPTHER

## 2019-11-11 RX ORDER — NITROGLYCERIN 0.4 MG/1
TABLET SUBLINGUAL
Qty: 25 TABLET | Refills: 1 | Status: SHIPPED | OUTPATIENT
Start: 2019-11-11 | End: 2020-08-05 | Stop reason: SDUPTHER

## 2020-04-28 ENCOUNTER — OFFICE VISIT (OUTPATIENT)
Dept: CARDIOLOGY | Facility: CLINIC | Age: 70
End: 2020-04-28

## 2020-04-28 VITALS
BODY MASS INDEX: 33.13 KG/M2 | WEIGHT: 187 LBS | DIASTOLIC BLOOD PRESSURE: 61 MMHG | HEART RATE: 47 BPM | HEIGHT: 63 IN | SYSTOLIC BLOOD PRESSURE: 104 MMHG

## 2020-04-28 DIAGNOSIS — I25.5 ISCHEMIC CARDIOMYOPATHY: ICD-10-CM

## 2020-04-28 DIAGNOSIS — I44.7 LEFT BUNDLE BRANCH BLOCK (LBBB): ICD-10-CM

## 2020-04-28 DIAGNOSIS — E78.2 MIXED HYPERLIPIDEMIA: ICD-10-CM

## 2020-04-28 DIAGNOSIS — R00.1 SINUS BRADYCARDIA: ICD-10-CM

## 2020-04-28 DIAGNOSIS — I10 ESSENTIAL HYPERTENSION: ICD-10-CM

## 2020-04-28 DIAGNOSIS — I25.10 CORONARY ARTERY DISEASE INVOLVING NATIVE CORONARY ARTERY OF NATIVE HEART WITHOUT ANGINA PECTORIS: Primary | ICD-10-CM

## 2020-04-28 PROCEDURE — 99441 PR PHYS/QHP TELEPHONE EVALUATION 5-10 MIN: CPT | Performed by: NURSE PRACTITIONER

## 2020-04-28 RX ORDER — CETIRIZINE HYDROCHLORIDE 10 MG/1
10 TABLET ORAL DAILY
COMMUNITY

## 2020-04-28 NOTE — PROGRESS NOTES
Telehealth Visit    Date of Visit: 2020  Encounter Provider: GALO Zamora  Place of Service: [facility] CARDIOLOGY  Patient Name: Petra Soto  :1950  Primary Cardiologist: Dr. Sherwin Robertson     Chief Complaint   Patient presents with   • Coronary Artery Disease   • Follow-up   • Annual Exam   :     Dear Dr. Flores,     HPI: Petra Soto is a pleasant 69 y.o. female who is an established patient of our practice. Due to COVID-19 virus, I am conducting a telehealth visit via telephone with patient and she has consented to this visit today. She is a new patient to me and her previous records have been reviewed.    In , she suffered a myocardial infarction.  Due to occlusion of LAD.  She underwent angioplasty and stenting of the LAD which failed and then underwent coronary artery bypass graft surgery.  In , repeat cardiac catheterization revealed complete occlusion of the LAD and saphenous vein graft to the LAD.  The rest of the vessels were normal.  She had mild ischemic cardiomyopathy with an LVEF of 45% and a longstanding left bundle branch block.  She has had chronic stable angina class II when walking up an incline.  She also has a history of sinus bradycardia with heart rates between 45-55 and has been asymptomatic.    In 2019, she followed up in the office with Dr. Sherwin Robertson and was doing well at that time.    Today is her annual follow-up visit.  She only experiences chest pain when walking up an incline on her driveway.  She says it starts in her throat goes down to her chest into her shoulder.  She takes 1 sublingual nitroglycerin and the symptoms resolved.  She denies shortness of breath, PND, orthopnea, cough, edema, palpitations, dizziness, syncope, or bleeding.  Her heart rate today is 47 bpm and she is asymptomatic.    I reviewed her last blood work from 3/18/2020: CMP normal.  Total cholesterol 133, triglycerides 58, HDL 47, and LDL 74.   "Hemoglobin A1c elevated at 7.3.    Past Medical History:   Diagnosis Date   • Carotid atherosclerosis     nonosbstructive, minimal by US 3/2016 (<15% bilaterally)   • Coronary artery disease     MI 1999, s/p PCI-LAD vs diagonal (3*13mm Duet), f/b near immediate CABG (LIMA-diag, SVG-LAD).  Cath 2005 with normal LM/LCx/RCA,  LAD,  SVG-LAD, patent LIMA-diag, R-L collaterals   • Diabetes mellitus (CMS/HCC)    • Essential hypertension    • Hyperlipidemia    • Ischemic cardiomyopathy     mild, EF 45%   • Left bundle branch block    • Multiple URI    • Stable angina (CMS/HCC)        Past Surgical History:   Procedure Laterality Date   • BLADDER SURGERY  12/2017   • CARDIAC SURGERY      Bypass.   • CATARACT EXTRACTION, BILATERAL     • CORONARY ARTERY BYPASS GRAFT     • HYSTERECTOMY     • PERIPHERAL ARTERIAL STENT GRAFT     • SUBMANDIBULAR GLAND EXCISION         Social History     Socioeconomic History   • Marital status:      Spouse name: Not on file   • Number of children: Not on file   • Years of education: Not on file   • Highest education level: Not on file   Tobacco Use   • Smoking status: Never Smoker   • Smokeless tobacco: Never Used   Substance and Sexual Activity   • Alcohol use: No     Comment: caffeine use: 'not often\"   • Drug use: No       Family History   Problem Relation Age of Onset   • Colon cancer Mother    • Stomach cancer Mother    • Heart disease Father    • Heart failure Father    • Hypertension Sister    • Thyroid disease Sister    • Hypertension Brother        The following portion of the patient's history were reviewed and updated as appropriate: past medical history, past surgical history, past social history, past family history, allergies, current medications, and problem list.    Review of Systems   Constitution: Negative.   Cardiovascular: Positive for chest pain.   Respiratory: Negative.    Neurological: Negative.        Allergies   Allergen Reactions   • Morphine And Related " "Nausea And Vomiting   • Oxycodone Nausea And Vomiting         Current Outpatient Medications:   •  amLODIPine (NORVASC) 5 MG tablet, Take 1 tablet by mouth Daily., Disp: 90 tablet, Rfl: 1  •  aspirin 81 MG tablet, Take 1 tablet by mouth Daily., Disp: 30 tablet, Rfl: 0  •  atorvastatin (LIPITOR) 20 MG tablet, TAKE ONE TABLET BY MOUTH DAILY, Disp: , Rfl:   •  cetirizine (zyrTEC) 10 MG tablet, Take 10 mg by mouth Daily., Disp: , Rfl:   •  estradiol (ESTRACE) 0.1 MG/GM vaginal cream, Insert 2 g into the vagina Daily., Disp: , Rfl:   •  glipiZIDE (GLUCOTROL) 10 MG tablet, Take 10 mg by mouth Daily., Disp: , Rfl:   •  glucose blood test strip, Test Blood Sugar two to three times a day as directed , Diagnosis E11.9, Accu-Check Smartview Test Strips, Disp: 300 each, Rfl: 1  •  isosorbide mononitrate (IMDUR) 60 MG 24 hr tablet, TAKE ONE TABLET BY MOUTH DAILY, Disp: 90 tablet, Rfl: 0  •  lisinopril (PRINIVIL,ZESTRIL) 40 MG tablet, TAKE ONE TABLET BY MOUTH DAILY, Disp: 90 tablet, Rfl: 0  •  metFORMIN (GLUCOPHAGE) 1000 MG tablet, TAKE ONE TABLET BY MOUTH TWICE A DAY WITH MEALS, Disp: 60 tablet, Rfl: 1  •  metoprolol succinate XL (TOPROL XL) 50 MG 24 hr tablet, Take 1 tablet by mouth Daily., Disp: 90 tablet, Rfl: 1  •  multivitamin (THERAGRAN) tablet tablet, Take 1 tablet by mouth daily., Disp: 30 tablet, Rfl: 0  •  nitroglycerin (NITROSTAT) 0.4 MG SL tablet, PLACE 1 TABLET UNDER THE TONGUE EVERY 5 MINUTES AS NEEDED FOR CHEST PAIN (MAX 3 DOSES IN 15 MINUTES), Disp: 25 tablet, Rfl: 1        Objective:     Vitals:    04/28/20 1406   BP: 104/61   BP Location: Left arm   Pulse: (!) 47   Weight: 84.8 kg (187 lb)   Height: 158.8 cm (62.5\")     Body mass index is 33.66 kg/m².    Due to telehealth visit, there was no EKG, vitals, or weight performed in our office.  Vitals/Weight were reported by the patient and conducted at home.       Assessment:       Diagnosis Plan   1. Coronary artery disease involving native coronary artery of " native heart without angina pectoris     2. Ischemic cardiomyopathy     3. Essential hypertension     4. Mixed hyperlipidemia     5. Sinus bradycardia     6. Left bundle branch block (LBBB)            Plan:       1.  Coronary Artery Disease: Experiences class II anginal symptoms when walking up an incline on her driveway and this is been chronic/stable for years..  She denies any angina with her daily activities.  I told her she could decrease her aspirin 325 mg to 81 mg daily and will continue with beta-blocker and atorvastatin.    2.  Ischemic Cardiomyopathy: Last ejection fraction was 45%.  Continue lisinopril and metoprolol.    3.  Hypertension: Continue to monitor.    4.  Hyperlipidemia: Last cholesterol cholesterol panel looked good and should continue with atorvastatin.    5.  Sinus Bradycardia: Heart rate 47 today and she is asymptomatic.  If she becomes symptomatic we could decrease her beta-blocker.  Dr. Robertson is aware of her heart rate and said it was fine on her last visit.    6.  Left Bundle Branch Block: Chronic on previous EKGs.    7.  Since today is her annual visit and notes being conducted telephone, I have recommended a six-month follow-up visit with myself or Dr. Robertson.    This patient has consented to a telehealth visit via telephone.. The visit was scheduled as a telephone visit to comply with patient safety concerns in accordance with CDC recommendations.  All vitals recorded within this visit are reported by the patient.  I spent 25 minutes in total including, but not limited to the 6 minutes spent in direct conversation with this patient.      As always, it has been a pleasure to participate in your patient's care. Thank you.       Sincerely,         GALO Ngo        Dictated utilizing Dragon dictation

## 2020-08-05 ENCOUNTER — OFFICE VISIT (OUTPATIENT)
Dept: CARDIOLOGY | Facility: CLINIC | Age: 70
End: 2020-08-05

## 2020-08-05 VITALS
SYSTOLIC BLOOD PRESSURE: 127 MMHG | WEIGHT: 188 LBS | BODY MASS INDEX: 34.6 KG/M2 | HEIGHT: 62 IN | DIASTOLIC BLOOD PRESSURE: 85 MMHG | HEART RATE: 49 BPM

## 2020-08-05 DIAGNOSIS — R00.1 SINUS BRADYCARDIA: ICD-10-CM

## 2020-08-05 DIAGNOSIS — I25.10 CORONARY ARTERY DISEASE INVOLVING NATIVE CORONARY ARTERY OF NATIVE HEART WITHOUT ANGINA PECTORIS: Primary | ICD-10-CM

## 2020-08-05 DIAGNOSIS — I25.5 ISCHEMIC CARDIOMYOPATHY: ICD-10-CM

## 2020-08-05 DIAGNOSIS — I44.7 LEFT BUNDLE BRANCH BLOCK (LBBB): ICD-10-CM

## 2020-08-05 DIAGNOSIS — I10 ESSENTIAL HYPERTENSION: ICD-10-CM

## 2020-08-05 DIAGNOSIS — E11.9 TYPE 2 DIABETES MELLITUS WITHOUT COMPLICATION, WITHOUT LONG-TERM CURRENT USE OF INSULIN (HCC): ICD-10-CM

## 2020-08-05 PROCEDURE — 99441 PR PHYS/QHP TELEPHONE EVALUATION 5-10 MIN: CPT | Performed by: INTERNAL MEDICINE

## 2020-08-05 RX ORDER — NITROGLYCERIN 0.4 MG/1
0.4 TABLET SUBLINGUAL
Qty: 30 TABLET | Refills: 1 | Status: SHIPPED | OUTPATIENT
Start: 2020-08-05 | End: 2020-09-28

## 2020-08-05 NOTE — PROGRESS NOTES
Date of Office Visit: 2020  Encounter Provider: Sherwin Robertson MD  Place of Service: [facility] CARDIOLOGY  Patient Name: Petra Soto  :1950    Chief Complaint   Patient presents with   • Coronary Artery Disease   :     HPI: Petra Soto is a 69 y.o. female who presents today to follow up via telehealth.      In , she suffered a myocardial infarction. Presumably this was due to occlusion of the left anterior descending artery. She underwent angioplasty and stenting but this failed and she then underwent coronary artery bypass grafting with a LIMA to the diagonal and a saphenous vein graft to the LAD. In , she underwent cardiac catheterization which revealed complete occlusion of the LAD as well as complete occlusion of the saphenous vein graft to the LAD. All other vessels were normal and her LIMA to the diagonal was normal as well. She has mild ischemic cardiomyopathy with an ejection fraction of 45%. She has a long standing left bundle branch block. She has chronic stable angina which is CCS class II.  Her pattern of angina has not increased in severity, frequency or quality.  There was a note from [place] that states a PET was done in , but I don't see a report.    She also has a long history of sinus bradycardia.   She checks her vitals at home and her pulse is usually 45-55 bpm, and her systolic pressure is in the 120s.  She has mild exertional dyspnea which is stable.  She denies palpitations, lightheadedness, syncope, edema, or orthopnea.     Past Medical History:   Diagnosis Date   • Carotid atherosclerosis     nonosbstructive, minimal by US 3/2016 (<15% bilaterally)   • Coronary artery disease     MI , s/p PCI-LAD vs diagonal (3*13mm Duet), f/b near immediate CABG (LIMA-diag, SVG-LAD).  Cath  with normal LM/LCx/RCA,  LAD,  SVG-LAD, patent LIMA-diag, R-L collaterals   • Diabetes mellitus (CMS/HCC)    • Essential hypertension    •  "Hyperlipidemia    • Ischemic cardiomyopathy     mild, EF 45%   • Left bundle branch block    • Multiple URI        Past Surgical History:   Procedure Laterality Date   • BLADDER SURGERY  12/2017   • CARDIAC SURGERY      Bypass.   • CATARACT EXTRACTION, BILATERAL     • CORONARY ARTERY BYPASS GRAFT     • HYSTERECTOMY     • PERIPHERAL ARTERIAL STENT GRAFT     • SUBMANDIBULAR GLAND EXCISION         Social History     Socioeconomic History   • Marital status:      Spouse name: Not on file   • Number of children: Not on file   • Years of education: Not on file   • Highest education level: Not on file   Tobacco Use   • Smoking status: Never Smoker   • Smokeless tobacco: Never Used   Substance and Sexual Activity   • Alcohol use: No     Comment: caffeine use: 'not often\"   • Drug use: No       Family History   Problem Relation Age of Onset   • Colon cancer Mother    • Stomach cancer Mother    • Heart disease Father    • Heart failure Father    • Hypertension Sister    • Thyroid disease Sister    • Hypertension Brother        Review of Systems   Constitution: Negative for malaise/fatigue.   HENT: Positive for hearing loss.    Cardiovascular: Positive for dyspnea on exertion. Negative for irregular heartbeat and leg swelling.   Respiratory: Negative for shortness of breath.    Neurological: Negative for dizziness and light-headedness.   All other systems reviewed and are negative.      Allergies   Allergen Reactions   • Morphine And Related Nausea And Vomiting   • Oxycodone Nausea And Vomiting         Current Outpatient Medications:   •  amLODIPine (NORVASC) 5 MG tablet, Take 1 tablet by mouth Daily., Disp: 90 tablet, Rfl: 1  •  aspirin 81 MG tablet, Take 1 tablet by mouth Daily., Disp: 30 tablet, Rfl: 0  •  atorvastatin (LIPITOR) 20 MG tablet, TAKE ONE TABLET BY MOUTH DAILY, Disp: , Rfl:   •  cetirizine (zyrTEC) 10 MG tablet, Take 10 mg by mouth Daily., Disp: , Rfl:   •  estradiol (ESTRACE) 0.1 MG/GM vaginal cream, " "Insert 2 g into the vagina Daily., Disp: , Rfl:   •  glipiZIDE (GLUCOTROL) 10 MG tablet, Take 10 mg by mouth Daily., Disp: , Rfl:   •  glucose blood test strip, Test Blood Sugar two to three times a day as directed , Diagnosis E11.9, Accu-Check Smartview Test Strips, Disp: 300 each, Rfl: 1  •  isosorbide mononitrate (IMDUR) 60 MG 24 hr tablet, TAKE ONE TABLET BY MOUTH DAILY, Disp: 90 tablet, Rfl: 0  •  lisinopril (PRINIVIL,ZESTRIL) 40 MG tablet, TAKE ONE TABLET BY MOUTH DAILY, Disp: 90 tablet, Rfl: 0  •  metFORMIN (GLUCOPHAGE) 1000 MG tablet, TAKE ONE TABLET BY MOUTH TWICE A DAY WITH MEALS, Disp: 60 tablet, Rfl: 1  •  metoprolol succinate XL (TOPROL XL) 50 MG 24 hr tablet, Take 1 tablet by mouth Daily., Disp: 90 tablet, Rfl: 1  •  multivitamin (THERAGRAN) tablet tablet, Take 1 tablet by mouth daily., Disp: 30 tablet, Rfl: 0  •  nitroglycerin (NITROSTAT) 0.4 MG SL tablet, PLACE 1 TABLET UNDER THE TONGUE EVERY 5 MINUTES AS NEEDED FOR CHEST PAIN (MAX 3 DOSES IN 15 MINUTES), Disp: 25 tablet, Rfl: 1     Objective:     Vitals:    08/05/20 0941   BP: 127/85   Pulse: (!) 49   Weight: 85.3 kg (188 lb)   Height: 157.5 cm (62\")     Body mass index is 34.39 kg/m².    Physical Exam   Constitutional: She is oriented to person, place, and time.   Neurological: She is alert and oriented to person, place, and time.   Psychiatric: She has a normal mood and affect. Her behavior is normal. Thought content normal.       Procedures      Assessment:       Diagnosis Plan   1. Coronary artery disease involving native coronary artery of native heart without angina pectoris     2. Ischemic cardiomyopathy     3. Left bundle branch block (LBBB)     4. Essential hypertension     5. Sinus bradycardia     6. Type 2 diabetes mellitus without complication, without long-term current use of insulin (CMS/[facility])            Plan:       1.  Coronary Artery Disease  Assessment  • The patient has CCS class II - angina only during vigorous physical activity  • " The patient has stable angina   • Continue atorvastatin.    Subjective - Objective  • There is a history of previous coronary artery bypass graft 1999  • Current antiplatelet therapy includes aspirin 81 mg      2/3.  She has mild ICM without CHF.  Her LVEF is 45%; this was stable by TTE in 2018.      4/5.  She has a long standing LBBB and known sinus bradycardia.  As she is tolerating it well, no changes were made. Her BP is within goal.    This patient has consented to a telehealth visit via phone. The visit was scheduled as a phone visit to comply with patient safety concerns in accordance with CDC recommendations.  All vitals recorded within this visit are reported by the patient.  I spent  12 minutes in total including but not limited to the 7 minutes spent in direct conversation with this patient.       Sincerely,       Sherwin Robertson MD

## 2020-09-28 RX ORDER — NITROGLYCERIN 0.4 MG/1
TABLET SUBLINGUAL
Qty: 25 TABLET | Refills: 2 | Status: SHIPPED | OUTPATIENT
Start: 2020-09-28 | End: 2021-01-26

## 2021-01-26 RX ORDER — NITROGLYCERIN 0.4 MG/1
TABLET SUBLINGUAL
Qty: 30 TABLET | Refills: 2 | Status: SHIPPED | OUTPATIENT
Start: 2021-01-26 | End: 2022-08-15 | Stop reason: SDUPTHER

## 2021-11-28 ENCOUNTER — APPOINTMENT (OUTPATIENT)
Dept: CT IMAGING | Facility: HOSPITAL | Age: 71
End: 2021-11-28

## 2021-11-28 ENCOUNTER — HOSPITAL ENCOUNTER (INPATIENT)
Facility: HOSPITAL | Age: 71
LOS: 1 days | Discharge: HOME | End: 2021-11-30
Attending: EMERGENCY MEDICINE | Admitting: INTERNAL MEDICINE

## 2021-11-28 ENCOUNTER — APPOINTMENT (OUTPATIENT)
Dept: GENERAL RADIOLOGY | Facility: HOSPITAL | Age: 71
End: 2021-11-28

## 2021-11-28 DIAGNOSIS — R09.02 HYPOXEMIA: ICD-10-CM

## 2021-11-28 DIAGNOSIS — J81.0 ACUTE PULMONARY EDEMA (HCC): ICD-10-CM

## 2021-11-28 DIAGNOSIS — I21.4 NSTEMI (NON-ST ELEVATED MYOCARDIAL INFARCTION) (HCC): Primary | ICD-10-CM

## 2021-11-28 LAB
ALBUMIN SERPL-MCNC: 4 G/DL (ref 3.5–5.2)
ALBUMIN/GLOB SERPL: 1.1 G/DL
ALP SERPL-CCNC: 77 U/L (ref 39–117)
ALT SERPL W P-5'-P-CCNC: 14 U/L (ref 1–33)
ANION GAP SERPL CALCULATED.3IONS-SCNC: 15.8 MMOL/L (ref 5–15)
APTT PPP: 28 SECONDS (ref 22.7–35.4)
AST SERPL-CCNC: 18 U/L (ref 1–32)
BASOPHILS # BLD AUTO: 0.07 10*3/MM3 (ref 0–0.2)
BASOPHILS NFR BLD AUTO: 0.8 % (ref 0–1.5)
BILIRUB SERPL-MCNC: 0.5 MG/DL (ref 0–1.2)
BUN SERPL-MCNC: 15 MG/DL (ref 8–23)
BUN/CREAT SERPL: 17.6 (ref 7–25)
CALCIUM SPEC-SCNC: 9.6 MG/DL (ref 8.6–10.5)
CHLORIDE SERPL-SCNC: 103 MMOL/L (ref 98–107)
CO2 SERPL-SCNC: 19.2 MMOL/L (ref 22–29)
CREAT SERPL-MCNC: 0.85 MG/DL (ref 0.57–1)
DEPRECATED RDW RBC AUTO: 41 FL (ref 37–54)
EOSINOPHIL # BLD AUTO: 0.1 10*3/MM3 (ref 0–0.4)
EOSINOPHIL NFR BLD AUTO: 1.2 % (ref 0.3–6.2)
ERYTHROCYTE [DISTWIDTH] IN BLOOD BY AUTOMATED COUNT: 13.4 % (ref 12.3–15.4)
GFR SERPL CREATININE-BSD FRML MDRD: 66 ML/MIN/1.73
GLOBULIN UR ELPH-MCNC: 3.8 GM/DL
GLUCOSE SERPL-MCNC: 225 MG/DL (ref 65–99)
HCT VFR BLD AUTO: 44.6 % (ref 34–46.6)
HGB BLD-MCNC: 15.1 G/DL (ref 12–15.9)
IMM GRANULOCYTES # BLD AUTO: 0.03 10*3/MM3 (ref 0–0.05)
IMM GRANULOCYTES NFR BLD AUTO: 0.3 % (ref 0–0.5)
INR PPP: 0.91 (ref 0.9–1.1)
LYMPHOCYTES # BLD AUTO: 1.82 10*3/MM3 (ref 0.7–3.1)
LYMPHOCYTES NFR BLD AUTO: 21.1 % (ref 19.6–45.3)
MAGNESIUM SERPL-MCNC: 1.6 MG/DL (ref 1.6–2.4)
MCH RBC QN AUTO: 28.4 PG (ref 26.6–33)
MCHC RBC AUTO-ENTMCNC: 33.9 G/DL (ref 31.5–35.7)
MCV RBC AUTO: 84 FL (ref 79–97)
MONOCYTES # BLD AUTO: 0.84 10*3/MM3 (ref 0.1–0.9)
MONOCYTES NFR BLD AUTO: 9.7 % (ref 5–12)
NEUTROPHILS NFR BLD AUTO: 5.78 10*3/MM3 (ref 1.7–7)
NEUTROPHILS NFR BLD AUTO: 66.9 % (ref 42.7–76)
NRBC BLD AUTO-RTO: 0 /100 WBC (ref 0–0.2)
NT-PROBNP SERPL-MCNC: 1087 PG/ML (ref 0–900)
PLATELET # BLD AUTO: 300 10*3/MM3 (ref 140–450)
PMV BLD AUTO: 9.5 FL (ref 6–12)
POTASSIUM SERPL-SCNC: 3.7 MMOL/L (ref 3.5–5.2)
PROT SERPL-MCNC: 7.8 G/DL (ref 6–8.5)
PROTHROMBIN TIME: 12.1 SECONDS (ref 11.7–14.2)
RBC # BLD AUTO: 5.31 10*6/MM3 (ref 3.77–5.28)
SODIUM SERPL-SCNC: 138 MMOL/L (ref 136–145)
TROPONIN T SERPL-MCNC: 0.03 NG/ML (ref 0–0.03)
WBC NRBC COR # BLD: 8.64 10*3/MM3 (ref 3.4–10.8)

## 2021-11-28 PROCEDURE — 93010 ELECTROCARDIOGRAM REPORT: CPT | Performed by: INTERNAL MEDICINE

## 2021-11-28 PROCEDURE — 85730 THROMBOPLASTIN TIME PARTIAL: CPT | Performed by: EMERGENCY MEDICINE

## 2021-11-28 PROCEDURE — 83880 ASSAY OF NATRIURETIC PEPTIDE: CPT | Performed by: EMERGENCY MEDICINE

## 2021-11-28 PROCEDURE — 71045 X-RAY EXAM CHEST 1 VIEW: CPT

## 2021-11-28 PROCEDURE — 80053 COMPREHEN METABOLIC PANEL: CPT | Performed by: EMERGENCY MEDICINE

## 2021-11-28 PROCEDURE — 84484 ASSAY OF TROPONIN QUANT: CPT | Performed by: EMERGENCY MEDICINE

## 2021-11-28 PROCEDURE — 93005 ELECTROCARDIOGRAM TRACING: CPT | Performed by: EMERGENCY MEDICINE

## 2021-11-28 PROCEDURE — 85025 COMPLETE CBC W/AUTO DIFF WBC: CPT | Performed by: EMERGENCY MEDICINE

## 2021-11-28 PROCEDURE — 85610 PROTHROMBIN TIME: CPT | Performed by: EMERGENCY MEDICINE

## 2021-11-28 PROCEDURE — 99285 EMERGENCY DEPT VISIT HI MDM: CPT

## 2021-11-28 PROCEDURE — 83735 ASSAY OF MAGNESIUM: CPT | Performed by: EMERGENCY MEDICINE

## 2021-11-28 PROCEDURE — 93005 ELECTROCARDIOGRAM TRACING: CPT

## 2021-11-28 PROCEDURE — 36415 COLL VENOUS BLD VENIPUNCTURE: CPT

## 2021-11-28 RX ORDER — SODIUM CHLORIDE 0.9 % (FLUSH) 0.9 %
10 SYRINGE (ML) INJECTION AS NEEDED
Status: DISCONTINUED | OUTPATIENT
Start: 2021-11-28 | End: 2021-11-30 | Stop reason: HOSPADM

## 2021-11-28 RX ORDER — ASPIRIN 325 MG
325 TABLET ORAL ONCE
Status: COMPLETED | OUTPATIENT
Start: 2021-11-28 | End: 2021-11-29

## 2021-11-29 ENCOUNTER — APPOINTMENT (OUTPATIENT)
Dept: CT IMAGING | Facility: HOSPITAL | Age: 71
End: 2021-11-29

## 2021-11-29 ENCOUNTER — APPOINTMENT (OUTPATIENT)
Dept: CARDIOLOGY | Facility: HOSPITAL | Age: 71
End: 2021-11-29

## 2021-11-29 PROBLEM — I21.4 NSTEMI (NON-ST ELEVATED MYOCARDIAL INFARCTION): Status: ACTIVE | Noted: 2021-11-29

## 2021-11-29 LAB
ANION GAP SERPL CALCULATED.3IONS-SCNC: 15.1 MMOL/L (ref 5–15)
AORTIC DIMENSIONLESS INDEX: 0.7 (DI)
BH CV ECHO MEAS - ACS: 2 CM
BH CV ECHO MEAS - AO MAX PG (FULL): 2 MMHG
BH CV ECHO MEAS - AO MAX PG: 3.9 MMHG
BH CV ECHO MEAS - AO MEAN PG (FULL): 0.96 MMHG
BH CV ECHO MEAS - AO MEAN PG: 2 MMHG
BH CV ECHO MEAS - AO ROOT AREA (BSA CORRECTED): 1.3
BH CV ECHO MEAS - AO ROOT AREA: 4.6 CM^2
BH CV ECHO MEAS - AO ROOT DIAM: 2.4 CM
BH CV ECHO MEAS - AO V2 MAX: 99.1 CM/SEC
BH CV ECHO MEAS - AO V2 MEAN: 65.1 CM/SEC
BH CV ECHO MEAS - AO V2 VTI: 16.5 CM
BH CV ECHO MEAS - AVA(I,A): 2.4 CM^2
BH CV ECHO MEAS - AVA(I,D): 2.4 CM^2
BH CV ECHO MEAS - AVA(V,A): 2.4 CM^2
BH CV ECHO MEAS - AVA(V,D): 2.4 CM^2
BH CV ECHO MEAS - BSA(HAYCOCK): 2 M^2
BH CV ECHO MEAS - BSA: 1.9 M^2
BH CV ECHO MEAS - BZI_BMI: 34.2 KILOGRAMS/M^2
BH CV ECHO MEAS - BZI_METRIC_HEIGHT: 157.5 CM
BH CV ECHO MEAS - BZI_METRIC_WEIGHT: 84.8 KG
BH CV ECHO MEAS - EDV(CUBED): 149.1 ML
BH CV ECHO MEAS - EDV(MOD-SP2): 181 ML
BH CV ECHO MEAS - EDV(MOD-SP4): 163 ML
BH CV ECHO MEAS - EDV(TEICH): 135.5 ML
BH CV ECHO MEAS - EF(CUBED): 38.2 %
BH CV ECHO MEAS - EF(MOD-BP): 30.2 %
BH CV ECHO MEAS - EF(MOD-SP2): 33.1 %
BH CV ECHO MEAS - EF(MOD-SP4): 28.2 %
BH CV ECHO MEAS - EF(TEICH): 31.2 %
BH CV ECHO MEAS - ESV(CUBED): 92.2 ML
BH CV ECHO MEAS - ESV(MOD-SP2): 121 ML
BH CV ECHO MEAS - ESV(MOD-SP4): 117 ML
BH CV ECHO MEAS - ESV(TEICH): 93.3 ML
BH CV ECHO MEAS - FS: 14.8 %
BH CV ECHO MEAS - IVS/LVPW: 0.88
BH CV ECHO MEAS - IVSD: 0.91 CM
BH CV ECHO MEAS - LAT PEAK E' VEL: 10 CM/SEC
BH CV ECHO MEAS - LV DIASTOLIC VOL/BSA (35-75): 87.7 ML/M^2
BH CV ECHO MEAS - LV MASS(C)D: 194.6 GRAMS
BH CV ECHO MEAS - LV MASS(C)DI: 104.8 GRAMS/M^2
BH CV ECHO MEAS - LV MAX PG: 1.9 MMHG
BH CV ECHO MEAS - LV MEAN PG: 1 MMHG
BH CV ECHO MEAS - LV SYSTOLIC VOL/BSA (12-30): 63 ML/M^2
BH CV ECHO MEAS - LV V1 MAX: 69.2 CM/SEC
BH CV ECHO MEAS - LV V1 MEAN: 46 CM/SEC
BH CV ECHO MEAS - LV V1 VTI: 11.5 CM
BH CV ECHO MEAS - LVIDD: 5.3 CM
BH CV ECHO MEAS - LVIDS: 4.5 CM
BH CV ECHO MEAS - LVLD AP2: 7.7 CM
BH CV ECHO MEAS - LVLD AP4: 7.8 CM
BH CV ECHO MEAS - LVLS AP2: 7.1 CM
BH CV ECHO MEAS - LVLS AP4: 7.2 CM
BH CV ECHO MEAS - LVOT AREA (M): 3.5 CM^2
BH CV ECHO MEAS - LVOT AREA: 3.4 CM^2
BH CV ECHO MEAS - LVOT DIAM: 2.1 CM
BH CV ECHO MEAS - LVPWD: 1 CM
BH CV ECHO MEAS - MED PEAK E' VEL: 6.5 CM/SEC
BH CV ECHO MEAS - MV DEC SLOPE: 701.2 CM/SEC^2
BH CV ECHO MEAS - MV DEC TIME: 160 SEC
BH CV ECHO MEAS - MV E MAX VEL: 112 CM/SEC
BH CV ECHO MEAS - MV MAX PG: 4 MMHG
BH CV ECHO MEAS - MV MEAN PG: 2 MMHG
BH CV ECHO MEAS - MV P1/2T MAX VEL: 100.4 CM/SEC
BH CV ECHO MEAS - MV P1/2T: 42 MSEC
BH CV ECHO MEAS - MV V2 MAX: 100.4 CM/SEC
BH CV ECHO MEAS - MV V2 MEAN: 65.7 CM/SEC
BH CV ECHO MEAS - MV V2 VTI: 15.1 CM
BH CV ECHO MEAS - MVA P1/2T LCG: 2.2 CM^2
BH CV ECHO MEAS - MVA(P1/2T): 5.2 CM^2
BH CV ECHO MEAS - MVA(VTI): 2.6 CM^2
BH CV ECHO MEAS - PA MAX PG (FULL): 2.9 MMHG
BH CV ECHO MEAS - PA MAX PG: 5.8 MMHG
BH CV ECHO MEAS - PA V2 MAX: 120.8 CM/SEC
BH CV ECHO MEAS - PVA(V,A): 1.6 CM^2
BH CV ECHO MEAS - PVA(V,D): 1.6 CM^2
BH CV ECHO MEAS - QP/QS: 0.81
BH CV ECHO MEAS - RAP SYSTOLE: 3 MMHG
BH CV ECHO MEAS - RV MAX PG: 2.9 MMHG
BH CV ECHO MEAS - RV MEAN PG: 1.4 MMHG
BH CV ECHO MEAS - RV V1 MAX: 85.7 CM/SEC
BH CV ECHO MEAS - RV V1 MEAN: 53.2 CM/SEC
BH CV ECHO MEAS - RV V1 VTI: 13.9 CM
BH CV ECHO MEAS - RVOT AREA: 2.3 CM^2
BH CV ECHO MEAS - RVOT DIAM: 1.7 CM
BH CV ECHO MEAS - SI(AO): 40.8 ML/M^2
BH CV ECHO MEAS - SI(CUBED): 30.6 ML/M^2
BH CV ECHO MEAS - SI(LVOT): 21.1 ML/M^2
BH CV ECHO MEAS - SI(MOD-SP2): 32.3 ML/M^2
BH CV ECHO MEAS - SI(MOD-SP4): 24.8 ML/M^2
BH CV ECHO MEAS - SI(TEICH): 22.7 ML/M^2
BH CV ECHO MEAS - SV(AO): 75.9 ML
BH CV ECHO MEAS - SV(CUBED): 56.9 ML
BH CV ECHO MEAS - SV(LVOT): 39.2 ML
BH CV ECHO MEAS - SV(MOD-SP2): 60 ML
BH CV ECHO MEAS - SV(MOD-SP4): 46 ML
BH CV ECHO MEAS - SV(RVOT): 31.6 ML
BH CV ECHO MEAS - SV(TEICH): 42.2 ML
BH CV ECHO MEAS - TAPSE (>1.6): 1.8 CM
BH CV ECHO MEASUREMENTS AVERAGE E/E' RATIO: 13.58
BH CV XLRA - TDI S': 11.7 CM/SEC
BUN SERPL-MCNC: 16 MG/DL (ref 8–23)
BUN/CREAT SERPL: 20.3 (ref 7–25)
CALCIUM SPEC-SCNC: 9.3 MG/DL (ref 8.6–10.5)
CHLORIDE SERPL-SCNC: 101 MMOL/L (ref 98–107)
CHOLEST SERPL-MCNC: 245 MG/DL (ref 0–200)
CO2 SERPL-SCNC: 19.9 MMOL/L (ref 22–29)
CREAT SERPL-MCNC: 0.79 MG/DL (ref 0.57–1)
DEPRECATED RDW RBC AUTO: 38.9 FL (ref 37–54)
ERYTHROCYTE [DISTWIDTH] IN BLOOD BY AUTOMATED COUNT: 13.1 % (ref 12.3–15.4)
GFR SERPL CREATININE-BSD FRML MDRD: 72 ML/MIN/1.73
GLUCOSE BLDC GLUCOMTR-MCNC: 287 MG/DL (ref 70–130)
GLUCOSE SERPL-MCNC: 333 MG/DL (ref 65–99)
HCT VFR BLD AUTO: 44.6 % (ref 34–46.6)
HDLC SERPL-MCNC: 57 MG/DL (ref 40–60)
HGB BLD-MCNC: 15.3 G/DL (ref 12–15.9)
HOLD SPECIMEN: NORMAL
HOLD SPECIMEN: NORMAL
LDLC SERPL CALC-MCNC: 177 MG/DL (ref 0–100)
LDLC/HDLC SERPL: 3.07 {RATIO}
LEFT ATRIUM VOLUME INDEX: 21.4 ML/M2
MAXIMAL PREDICTED HEART RATE: 151 BPM
MCH RBC QN AUTO: 28.4 PG (ref 26.6–33)
MCHC RBC AUTO-ENTMCNC: 34.3 G/DL (ref 31.5–35.7)
MCV RBC AUTO: 82.7 FL (ref 79–97)
PLATELET # BLD AUTO: 323 10*3/MM3 (ref 140–450)
PMV BLD AUTO: 9.7 FL (ref 6–12)
POTASSIUM SERPL-SCNC: 4 MMOL/L (ref 3.5–5.2)
QT INTERVAL: 357 MS
QT INTERVAL: 410 MS
RBC # BLD AUTO: 5.39 10*6/MM3 (ref 3.77–5.28)
SARS-COV-2 RNA RESP QL NAA+PROBE: NOT DETECTED
SINUS: 3 CM
SODIUM SERPL-SCNC: 136 MMOL/L (ref 136–145)
STRESS TARGET HR: 128 BPM
TRIGL SERPL-MCNC: 65 MG/DL (ref 0–150)
TROPONIN T SERPL-MCNC: 0.11 NG/ML (ref 0–0.03)
TROPONIN T SERPL-MCNC: 0.18 NG/ML (ref 0–0.03)
VLDLC SERPL-MCNC: 11 MG/DL (ref 5–40)
WBC NRBC COR # BLD: 9.68 10*3/MM3 (ref 3.4–10.8)
WHOLE BLOOD HOLD SPECIMEN: NORMAL
WHOLE BLOOD HOLD SPECIMEN: NORMAL

## 2021-11-29 PROCEDURE — C1769 GUIDE WIRE: HCPCS | Performed by: INTERNAL MEDICINE

## 2021-11-29 PROCEDURE — C1894 INTRO/SHEATH, NON-LASER: HCPCS | Performed by: INTERNAL MEDICINE

## 2021-11-29 PROCEDURE — 85027 COMPLETE CBC AUTOMATED: CPT | Performed by: INTERNAL MEDICINE

## 2021-11-29 PROCEDURE — 80061 LIPID PANEL: CPT | Performed by: INTERNAL MEDICINE

## 2021-11-29 PROCEDURE — 25010000002 FENTANYL CITRATE (PF) 50 MCG/ML SOLUTION: Performed by: INTERNAL MEDICINE

## 2021-11-29 PROCEDURE — 84484 ASSAY OF TROPONIN QUANT: CPT | Performed by: EMERGENCY MEDICINE

## 2021-11-29 PROCEDURE — 25010000002 MIDAZOLAM PER 1 MG: Performed by: INTERNAL MEDICINE

## 2021-11-29 PROCEDURE — B2151ZZ FLUOROSCOPY OF LEFT HEART USING LOW OSMOLAR CONTRAST: ICD-10-PCS | Performed by: INTERNAL MEDICINE

## 2021-11-29 PROCEDURE — 93010 ELECTROCARDIOGRAM REPORT: CPT | Performed by: INTERNAL MEDICINE

## 2021-11-29 PROCEDURE — 25010000002 FUROSEMIDE PER 20 MG: Performed by: EMERGENCY MEDICINE

## 2021-11-29 PROCEDURE — 71275 CT ANGIOGRAPHY CHEST: CPT

## 2021-11-29 PROCEDURE — 25010000002 PERFLUTREN (DEFINITY) 8.476 MG IN SODIUM CHLORIDE (PF) 0.9 % 10 ML INJECTION: Performed by: INTERNAL MEDICINE

## 2021-11-29 PROCEDURE — 93459 L HRT ART/GRFT ANGIO: CPT | Performed by: INTERNAL MEDICINE

## 2021-11-29 PROCEDURE — 0 IOPAMIDOL PER 1 ML: Performed by: EMERGENCY MEDICINE

## 2021-11-29 PROCEDURE — 93306 TTE W/DOPPLER COMPLETE: CPT | Performed by: INTERNAL MEDICINE

## 2021-11-29 PROCEDURE — B2121ZZ FLUOROSCOPY OF SINGLE CORONARY ARTERY BYPASS GRAFT USING LOW OSMOLAR CONTRAST: ICD-10-PCS | Performed by: INTERNAL MEDICINE

## 2021-11-29 PROCEDURE — 99153 MOD SED SAME PHYS/QHP EA: CPT | Performed by: INTERNAL MEDICINE

## 2021-11-29 PROCEDURE — B2111ZZ FLUOROSCOPY OF MULTIPLE CORONARY ARTERIES USING LOW OSMOLAR CONTRAST: ICD-10-PCS | Performed by: INTERNAL MEDICINE

## 2021-11-29 PROCEDURE — 84484 ASSAY OF TROPONIN QUANT: CPT | Performed by: INTERNAL MEDICINE

## 2021-11-29 PROCEDURE — 25010000002 ONDANSETRON PER 1 MG

## 2021-11-29 PROCEDURE — 0 IOPAMIDOL PER 1 ML: Performed by: INTERNAL MEDICINE

## 2021-11-29 PROCEDURE — U0005 INFEC AGEN DETEC AMPLI PROBE: HCPCS | Performed by: EMERGENCY MEDICINE

## 2021-11-29 PROCEDURE — 4A023N7 MEASUREMENT OF CARDIAC SAMPLING AND PRESSURE, LEFT HEART, PERCUTANEOUS APPROACH: ICD-10-PCS | Performed by: INTERNAL MEDICINE

## 2021-11-29 PROCEDURE — 80048 BASIC METABOLIC PNL TOTAL CA: CPT | Performed by: INTERNAL MEDICINE

## 2021-11-29 PROCEDURE — 25010000002 HEPARIN (PORCINE) PER 1000 UNITS: Performed by: INTERNAL MEDICINE

## 2021-11-29 PROCEDURE — 93005 ELECTROCARDIOGRAM TRACING: CPT

## 2021-11-29 PROCEDURE — 93306 TTE W/DOPPLER COMPLETE: CPT

## 2021-11-29 PROCEDURE — U0003 INFECTIOUS AGENT DETECTION BY NUCLEIC ACID (DNA OR RNA); SEVERE ACUTE RESPIRATORY SYNDROME CORONAVIRUS 2 (SARS-COV-2) (CORONAVIRUS DISEASE [COVID-19]), AMPLIFIED PROBE TECHNIQUE, MAKING USE OF HIGH THROUGHPUT TECHNOLOGIES AS DESCRIBED BY CMS-2020-01-R: HCPCS | Performed by: EMERGENCY MEDICINE

## 2021-11-29 PROCEDURE — 82962 GLUCOSE BLOOD TEST: CPT

## 2021-11-29 PROCEDURE — 99223 1ST HOSP IP/OBS HIGH 75: CPT | Performed by: INTERNAL MEDICINE

## 2021-11-29 PROCEDURE — 25010000002 DIPHENHYDRAMINE PER 50 MG: Performed by: EMERGENCY MEDICINE

## 2021-11-29 PROCEDURE — 25010000002 METHYLPREDNISOLONE PER 125 MG: Performed by: EMERGENCY MEDICINE

## 2021-11-29 PROCEDURE — 99152 MOD SED SAME PHYS/QHP 5/>YRS: CPT | Performed by: INTERNAL MEDICINE

## 2021-11-29 PROCEDURE — 94640 AIRWAY INHALATION TREATMENT: CPT

## 2021-11-29 PROCEDURE — B2181ZZ FLUOROSCOPY OF LEFT INTERNAL MAMMARY BYPASS GRAFT USING LOW OSMOLAR CONTRAST: ICD-10-PCS | Performed by: INTERNAL MEDICINE

## 2021-11-29 RX ORDER — MORPHINE SULFATE 2 MG/ML
1 INJECTION, SOLUTION INTRAMUSCULAR; INTRAVENOUS EVERY 4 HOURS PRN
Status: DISCONTINUED | OUTPATIENT
Start: 2021-11-29 | End: 2021-11-30 | Stop reason: HOSPADM

## 2021-11-29 RX ORDER — ONDANSETRON 2 MG/ML
4 INJECTION INTRAMUSCULAR; INTRAVENOUS ONCE
Status: COMPLETED | OUTPATIENT
Start: 2021-11-29 | End: 2021-11-29

## 2021-11-29 RX ORDER — FENTANYL CITRATE 50 UG/ML
INJECTION, SOLUTION INTRAMUSCULAR; INTRAVENOUS AS NEEDED
Status: DISCONTINUED | OUTPATIENT
Start: 2021-11-29 | End: 2021-11-29 | Stop reason: HOSPADM

## 2021-11-29 RX ORDER — LISINOPRIL 40 MG/1
40 TABLET ORAL DAILY
Status: DISCONTINUED | OUTPATIENT
Start: 2021-11-29 | End: 2021-11-30 | Stop reason: HOSPADM

## 2021-11-29 RX ORDER — ATORVASTATIN CALCIUM 20 MG/1
20 TABLET, FILM COATED ORAL NIGHTLY
Status: DISCONTINUED | OUTPATIENT
Start: 2021-11-29 | End: 2021-11-30 | Stop reason: HOSPADM

## 2021-11-29 RX ORDER — IPRATROPIUM BROMIDE AND ALBUTEROL SULFATE 2.5; .5 MG/3ML; MG/3ML
3 SOLUTION RESPIRATORY (INHALATION) ONCE
Status: COMPLETED | OUTPATIENT
Start: 2021-11-29 | End: 2021-11-29

## 2021-11-29 RX ORDER — AMLODIPINE BESYLATE 5 MG/1
5 TABLET ORAL DAILY
Status: DISCONTINUED | OUTPATIENT
Start: 2021-11-29 | End: 2021-11-30 | Stop reason: HOSPADM

## 2021-11-29 RX ORDER — NALOXONE HCL 0.4 MG/ML
0.4 VIAL (ML) INJECTION
Status: DISCONTINUED | OUTPATIENT
Start: 2021-11-29 | End: 2021-11-30 | Stop reason: HOSPADM

## 2021-11-29 RX ORDER — METOPROLOL SUCCINATE 100 MG/1
100 TABLET, EXTENDED RELEASE ORAL DAILY
Status: DISCONTINUED | OUTPATIENT
Start: 2021-11-29 | End: 2021-11-30 | Stop reason: HOSPADM

## 2021-11-29 RX ORDER — FUROSEMIDE 10 MG/ML
80 INJECTION INTRAMUSCULAR; INTRAVENOUS ONCE
Status: COMPLETED | OUTPATIENT
Start: 2021-11-29 | End: 2021-11-29

## 2021-11-29 RX ORDER — DIPHENHYDRAMINE HYDROCHLORIDE 50 MG/ML
25 INJECTION INTRAMUSCULAR; INTRAVENOUS ONCE
Status: COMPLETED | OUTPATIENT
Start: 2021-11-29 | End: 2021-11-29

## 2021-11-29 RX ORDER — METHYLPREDNISOLONE SODIUM SUCCINATE 125 MG/2ML
125 INJECTION, POWDER, LYOPHILIZED, FOR SOLUTION INTRAMUSCULAR; INTRAVENOUS ONCE
Status: COMPLETED | OUTPATIENT
Start: 2021-11-29 | End: 2021-11-29

## 2021-11-29 RX ORDER — SODIUM CHLORIDE 9 MG/ML
INJECTION, SOLUTION INTRAVENOUS CONTINUOUS PRN
Status: COMPLETED | OUTPATIENT
Start: 2021-11-29 | End: 2021-11-29

## 2021-11-29 RX ORDER — SODIUM CHLORIDE 9 MG/ML
75 INJECTION, SOLUTION INTRAVENOUS CONTINUOUS
Status: DISCONTINUED | OUTPATIENT
Start: 2021-11-29 | End: 2021-11-30 | Stop reason: HOSPADM

## 2021-11-29 RX ORDER — MIDAZOLAM HYDROCHLORIDE 1 MG/ML
INJECTION INTRAMUSCULAR; INTRAVENOUS AS NEEDED
Status: DISCONTINUED | OUTPATIENT
Start: 2021-11-29 | End: 2021-11-29 | Stop reason: HOSPADM

## 2021-11-29 RX ORDER — ENALAPRILAT 2.5 MG/2ML
1.25 INJECTION INTRAVENOUS ONCE
Status: COMPLETED | OUTPATIENT
Start: 2021-11-29 | End: 2021-11-29

## 2021-11-29 RX ORDER — ASPIRIN 81 MG/1
81 TABLET ORAL DAILY
Status: DISCONTINUED | OUTPATIENT
Start: 2021-11-29 | End: 2021-11-30 | Stop reason: HOSPADM

## 2021-11-29 RX ORDER — HYDROCODONE BITARTRATE AND ACETAMINOPHEN 5; 325 MG/1; MG/1
1 TABLET ORAL EVERY 4 HOURS PRN
Status: DISCONTINUED | OUTPATIENT
Start: 2021-11-29 | End: 2021-11-30 | Stop reason: HOSPADM

## 2021-11-29 RX ORDER — ISOSORBIDE MONONITRATE 60 MG/1
60 TABLET, EXTENDED RELEASE ORAL DAILY
Status: DISCONTINUED | OUTPATIENT
Start: 2021-11-29 | End: 2021-11-30 | Stop reason: HOSPADM

## 2021-11-29 RX ORDER — LIDOCAINE HYDROCHLORIDE 20 MG/ML
INJECTION, SOLUTION INFILTRATION; PERINEURAL AS NEEDED
Status: DISCONTINUED | OUTPATIENT
Start: 2021-11-29 | End: 2021-11-29 | Stop reason: HOSPADM

## 2021-11-29 RX ORDER — ONDANSETRON 2 MG/ML
4 INJECTION INTRAMUSCULAR; INTRAVENOUS EVERY 6 HOURS PRN
Status: DISCONTINUED | OUTPATIENT
Start: 2021-11-29 | End: 2021-11-30 | Stop reason: HOSPADM

## 2021-11-29 RX ORDER — NITROGLYCERIN 0.4 MG/1
0.4 TABLET SUBLINGUAL
Status: DISCONTINUED | OUTPATIENT
Start: 2021-11-29 | End: 2021-11-30 | Stop reason: HOSPADM

## 2021-11-29 RX ORDER — ACETAMINOPHEN 325 MG/1
650 TABLET ORAL EVERY 4 HOURS PRN
Status: DISCONTINUED | OUTPATIENT
Start: 2021-11-29 | End: 2021-11-30 | Stop reason: HOSPADM

## 2021-11-29 RX ORDER — ONDANSETRON 4 MG/1
4 TABLET, FILM COATED ORAL EVERY 6 HOURS PRN
Status: DISCONTINUED | OUTPATIENT
Start: 2021-11-29 | End: 2021-11-30 | Stop reason: HOSPADM

## 2021-11-29 RX ORDER — GLIPIZIDE 10 MG/1
10 TABLET ORAL 2 TIMES DAILY
Status: DISCONTINUED | OUTPATIENT
Start: 2021-11-29 | End: 2021-11-30 | Stop reason: HOSPADM

## 2021-11-29 RX ORDER — TRIMETHOPRIM 100 MG/1
100 TABLET ORAL DAILY
COMMUNITY
End: 2022-10-05

## 2021-11-29 RX ORDER — ONDANSETRON 2 MG/ML
INJECTION INTRAMUSCULAR; INTRAVENOUS
Status: COMPLETED
Start: 2021-11-29 | End: 2021-11-29

## 2021-11-29 RX ADMIN — ASPIRIN 325 MG: 325 TABLET ORAL at 00:21

## 2021-11-29 RX ADMIN — METOPROLOL SUCCINATE 100 MG: 100 TABLET, EXTENDED RELEASE ORAL at 13:02

## 2021-11-29 RX ADMIN — PERFLUTREN 2 ML: 6.52 INJECTION, SUSPENSION INTRAVENOUS at 16:13

## 2021-11-29 RX ADMIN — ONDANSETRON 4 MG: 2 INJECTION INTRAMUSCULAR; INTRAVENOUS at 00:57

## 2021-11-29 RX ADMIN — FUROSEMIDE 80 MG: 10 INJECTION, SOLUTION INTRAMUSCULAR; INTRAVENOUS at 01:07

## 2021-11-29 RX ADMIN — IPRATROPIUM BROMIDE AND ALBUTEROL SULFATE 3 ML: 2.5; .5 SOLUTION RESPIRATORY (INHALATION) at 00:51

## 2021-11-29 RX ADMIN — ENALAPRILAT 1.25 MG: 2.5 INJECTION INTRAVENOUS at 01:09

## 2021-11-29 RX ADMIN — GLIPIZIDE 10 MG: 10 TABLET ORAL at 13:01

## 2021-11-29 RX ADMIN — ATORVASTATIN CALCIUM 20 MG: 20 TABLET, FILM COATED ORAL at 20:15

## 2021-11-29 RX ADMIN — IOPAMIDOL 95 ML: 755 INJECTION, SOLUTION INTRAVENOUS at 00:11

## 2021-11-29 RX ADMIN — METHYLPREDNISOLONE SODIUM SUCCINATE 125 MG: 125 INJECTION, POWDER, FOR SOLUTION INTRAMUSCULAR; INTRAVENOUS at 00:40

## 2021-11-29 RX ADMIN — AMLODIPINE BESYLATE 5 MG: 5 TABLET ORAL at 13:02

## 2021-11-29 RX ADMIN — ASPIRIN 81 MG: 81 TABLET, COATED ORAL at 13:02

## 2021-11-29 RX ADMIN — DIPHENHYDRAMINE HYDROCHLORIDE 25 MG: 50 INJECTION, SOLUTION INTRAMUSCULAR; INTRAVENOUS at 00:40

## 2021-11-29 RX ADMIN — NITROGLYCERIN 1 INCH: 20 OINTMENT TOPICAL at 00:26

## 2021-11-29 RX ADMIN — GLIPIZIDE 10 MG: 10 TABLET ORAL at 20:14

## 2021-11-29 RX ADMIN — ISOSORBIDE MONONITRATE 60 MG: 60 TABLET ORAL at 13:02

## 2021-11-29 RX ADMIN — LISINOPRIL 40 MG: 40 TABLET ORAL at 13:01

## 2021-11-29 RX ADMIN — METOPROLOL TARTRATE 5 MG: 1 INJECTION, SOLUTION INTRAVENOUS at 00:24

## 2021-11-30 VITALS
BODY MASS INDEX: 34.41 KG/M2 | WEIGHT: 187 LBS | SYSTOLIC BLOOD PRESSURE: 105 MMHG | HEIGHT: 62 IN | RESPIRATION RATE: 16 BRPM | DIASTOLIC BLOOD PRESSURE: 62 MMHG | HEART RATE: 56 BPM | OXYGEN SATURATION: 95 % | TEMPERATURE: 98.1 F

## 2021-11-30 LAB — QT INTERVAL: 684 MS

## 2021-11-30 PROCEDURE — 93005 ELECTROCARDIOGRAM TRACING: CPT | Performed by: INTERNAL MEDICINE

## 2021-11-30 PROCEDURE — 99239 HOSP IP/OBS DSCHRG MGMT >30: CPT | Performed by: INTERNAL MEDICINE

## 2021-11-30 PROCEDURE — 93010 ELECTROCARDIOGRAM REPORT: CPT | Performed by: INTERNAL MEDICINE

## 2021-11-30 RX ORDER — METOPROLOL SUCCINATE 100 MG/1
100 TABLET, EXTENDED RELEASE ORAL DAILY
Qty: 30 TABLET | Refills: 11 | Status: SHIPPED | OUTPATIENT
Start: 2021-11-30 | End: 2022-12-08

## 2021-11-30 RX ADMIN — LISINOPRIL 40 MG: 40 TABLET ORAL at 08:27

## 2021-11-30 RX ADMIN — GLIPIZIDE 10 MG: 10 TABLET ORAL at 08:27

## 2021-11-30 RX ADMIN — ASPIRIN 81 MG: 81 TABLET, COATED ORAL at 08:27

## 2021-11-30 RX ADMIN — METOPROLOL SUCCINATE 100 MG: 100 TABLET, EXTENDED RELEASE ORAL at 10:33

## 2021-11-30 RX ADMIN — AMLODIPINE BESYLATE 5 MG: 5 TABLET ORAL at 08:27

## 2021-11-30 RX ADMIN — ISOSORBIDE MONONITRATE 60 MG: 60 TABLET ORAL at 08:27

## 2021-12-01 ENCOUNTER — READMISSION MANAGEMENT (OUTPATIENT)
Dept: CALL CENTER | Facility: HOSPITAL | Age: 71
End: 2021-12-01

## 2021-12-01 NOTE — OUTREACH NOTE
Prep Survey      Responses   [facility] facility patient discharged from? [place]   Is LACE score < 7 ? No   Emergency Room discharge w/ pulse ox? No   Eligibility Readm Mgmt   Discharge diagnosis NSTEMI   Does the patient have one of the following disease processes/diagnoses(primary or secondary)? Acute MI (STEMI,NSTEMI)   Does the patient have Home health ordered? No   Is there a DME ordered? No   Comments regarding appointments f/u appts needed   Medication alerts for this patient Glucophage and Toprol XL   Prep survey completed? Yes          Basilia Armijo RN

## 2021-12-06 ENCOUNTER — READMISSION MANAGEMENT (OUTPATIENT)
Dept: CALL CENTER | Facility: HOSPITAL | Age: 71
End: 2021-12-06

## 2021-12-06 ENCOUNTER — TELEPHONE (OUTPATIENT)
Dept: CARDIOLOGY | Facility: CLINIC | Age: 71
End: 2021-12-06

## 2021-12-06 NOTE — TELEPHONE ENCOUNTER
Pt called and is wanting to report that her HR has been running in the low to mid 40's since her recent MI, and her Metoprolol was changed to 100mg qd.    Please advise,    Thank you,    Belem Curry, CMA

## 2021-12-06 NOTE — TELEPHONE ENCOUNTER
Notified pt. She verbalized understanding.    Thank you,    Juliet Berrios, RN  Triage [facility]

## 2021-12-06 NOTE — OUTREACH NOTE
AMI Week 1 Survey      Responses   [facility] patient discharged from? [place]   Does the patient have one of the following disease processes/diagnoses(primary or secondary)? Acute MI (STEMI,NSTEMI)   Week 1 attempt successful? No   Unsuccessful attempts Attempt 1          Ida Phillip RN

## 2021-12-07 NOTE — PROGRESS NOTES
"RM:________     PCP: Melony Flores MD    : 1950  AGE: 71 y.o.  EST PATIENT   REASON FOR VISIT/  CC:    BP Readings from Last 3 Encounters:   21 105/62   20 127/85   20 104/61        WT: ____________ BP: __________L __________R HR______    CHEST PAIN: _____________    SOA: _____________PALPS: _______________     LIGHTHEADED: ___________FATIGUE: ________________ EDEMA __________    ALLERGIES:Contrast dye, Morphine and related, and Oxycodone SMOKING HISTORY:  Social History     Tobacco Use   • Smoking status: Never Smoker   • Smokeless tobacco: Never Used   Substance Use Topics   • Alcohol use: No     Comment: caffeine use: 'not often\"   • Drug use: No     CAFFEINE USE_________________  ALCOHOL ______________________    Below is the patient's most recent value for Albumin, ALT, AST, BUN, Calcium, Chloride, Cholesterol, CO2, Creatinine, GFR, Glucose, HDL, Hematocrit, Hemoglobin, Hemoglobin A1C, LDL, Magnesium, Phosphorus, Platelets, Potassium, PSA, Sodium, Triglycerides, TSH and WBC.   Lab Results   Component Value Date    ALBUMIN 4.00 2021    ALT 14 2021    AST 18 2021    BUN 16 2021    CALCIUM 9.3 2021     2021    CHOL 245 (H) 2021    CO2 19.9 (L) 2021    CREATININE 0.79 2021     (H) 10/27/2021    HDL 57 2021    HCT 44.6 2021    HGB 15.3 2021    HGBA1C 8.7 (H) 10/27/2021     (H) 2021    MG 1.6 2021     2021    K 4.0 2021     2021    TRIG 65 2021    TSH 2.550 10/27/2021    WBC 9.68 2021          NEW DIAGNOSIS/ SURGERY/ HOSP OR ED VISITS: ______________________    __________________________________________________________________      RECENT LABS OR DIAGNOSTIC TESTING:  _____________________________    __________________________________________________________________      ASSESSMENT/ PLAN: " _______________________________________________    __________________________________________________________________

## 2021-12-08 ENCOUNTER — READMISSION MANAGEMENT (OUTPATIENT)
Dept: CALL CENTER | Facility: HOSPITAL | Age: 71
End: 2021-12-08

## 2021-12-08 NOTE — OUTREACH NOTE
AMI Week 1 Survey      Responses   [facility] patient discharged from? [place]   Does the patient have one of the following disease processes/diagnoses(primary or secondary)? Acute MI (STEMI,NSTEMI)   Week 1 attempt successful? Yes   Call start time 1548   Call end time 1554   Discharge diagnosis NSTEMI   Medication comments Metoprolol is at 75 mg r/t lower pulse.    Comments regarding appointments Cardio 12/15/21   Does the patient have a primary care provider?  Yes   Comments regarding PCP Had followed up prior to the hospital.   Psychosocial issues? No   Comments     Did the patient receive a copy of their discharge instructions? Yes   Nursing interventions Reviewed instructions with patient   What is the patient's perception of their health status since discharge? Improving   Nursing interventions Nurse provided patient education   Is the patient/caregiver able to teach back signs and symptoms of when to call for help immediately: Sudden chest discomfort,  Sudden discomfort in arms, back, neck or jaw,  Shortness of breath at any time,  Sudden sweating or clammy skin,  Nausea or vomiting,  Dizziness or lightheadedness,  Irregular or rapid heart rate   Nursing interventions Nurse provided patient education   Is the patient/caregiver able to teach back lifestyle changes to help prevent MIs Heart healthy diet,  Reducing stress,  Managing diabetes   Is the patient/caregiver able to teach back ways to prevent a second heart attack: Follow up with MD,  Take medications   If the patient is a current smoker, are they able to teach back resources for cessation? Not a smoker   Is the patient/caregiver able to teach back the hierarchy of who to call/visit for symptoms/problems? PCP, Specialist, Home health nurse, Urgent Care, ED, 911 Yes   Week 1 call completed? Yes   Wrap up additional comments Pt reports she is doing better. Staets her Dr had her decreased the metoprolol as her heart rate dropped down to  far. Pt has no needs at this time.           Kristin Villalobos RN

## 2021-12-15 ENCOUNTER — OFFICE VISIT (OUTPATIENT)
Dept: CARDIOLOGY | Facility: CLINIC | Age: 71
End: 2021-12-15

## 2021-12-15 VITALS
HEIGHT: 62 IN | WEIGHT: 185.6 LBS | DIASTOLIC BLOOD PRESSURE: 72 MMHG | HEART RATE: 43 BPM | SYSTOLIC BLOOD PRESSURE: 104 MMHG | BODY MASS INDEX: 34.16 KG/M2

## 2021-12-15 DIAGNOSIS — R00.1 SYMPTOMATIC SINUS BRADYCARDIA: ICD-10-CM

## 2021-12-15 DIAGNOSIS — I25.118 CORONARY ARTERY DISEASE OF NATIVE ARTERY OF NATIVE HEART WITH STABLE ANGINA PECTORIS (HCC): Primary | ICD-10-CM

## 2021-12-15 DIAGNOSIS — I44.7 LEFT BUNDLE BRANCH BLOCK (LBBB): ICD-10-CM

## 2021-12-15 DIAGNOSIS — I10 ESSENTIAL HYPERTENSION: ICD-10-CM

## 2021-12-15 DIAGNOSIS — I25.5 ISCHEMIC CARDIOMYOPATHY: ICD-10-CM

## 2021-12-15 PROBLEM — I21.4 NSTEMI (NON-ST ELEVATED MYOCARDIAL INFARCTION) (HCC): Status: RESOLVED | Noted: 2021-11-29 | Resolved: 2021-12-15

## 2021-12-15 PROCEDURE — 99214 OFFICE O/P EST MOD 30 MIN: CPT | Performed by: INTERNAL MEDICINE

## 2021-12-15 PROCEDURE — 93000 ELECTROCARDIOGRAM COMPLETE: CPT | Performed by: INTERNAL MEDICINE

## 2021-12-15 NOTE — PROGRESS NOTES
Date of Office Visit: 12/15/2021  Encounter Provider: Sherwin Robertson MD  Place of Service: [facility] CARDIOLOGY  Patient Name: Petra Soto  :1950    Chief Complaint   Patient presents with   • Coronary Artery Disease   :     HPI: Petra Soto is a 71 y.o. female who presents today to follow up. I have reviewed prior notes and there are no changes except for any new updates described below. I have also reviewed any information entered into the medical record by the patient or by ancillary staff.     In , she suffered a myocardial infarction. Presumably this was due to occlusion of the left anterior descending artery. She underwent angioplasty and stenting but this failed and she then underwent coronary artery bypass grafting with a LIMA to the diagonal and a saphenous vein graft to the LAD. In , she underwent cardiac catheterization which revealed complete occlusion of the LAD as well as complete occlusion of the saphenous vein graft to the LAD. All other vessels were normal and her LIMA to the diagonal was normal as well. Her LVEF had been stable at 45% for years, and she had a LBBB.     She has a long history of sinus bradycardia, and until recently, her HR was ~50bpm, but she tolerated it well.    She presented in 2021 with progressive dyspnea and angina. She was found to be in decompensated CHF and suffered a type 2 (demand) myocardial infarction. An echo revealed a decline in LVEF to 25% with a dyskinetic septum due to a left bundle. Coronary angiography revealed a patent LIMA-D1, but the SVG-LAD was occluded. The native LAD was occluded distally. There was 40-50% disease elsewhere.  Her metoprolol dose was increased at discharge, and she was diuresed.    She called a few days after discharge with a HR of ~40bpm. I cut her metoprolol back to her prior dose of 50mg daily. However, her pulse remains ~40-45bpm. When she exerts herself, her pulse does not go up,  and she feels very weak. Her dyspnea and chest pain are greatly improved, but not resolved. She denies leg swelling or orthopnea.     Past Medical History:   Diagnosis Date   • Carotid atherosclerosis     nonosbstructive, minimal by US 3/2016 (<15% bilaterally)   • Coronary artery disease     MI 1999, s/p PCI-LAD vs diagonal (3*13mm Duet), f/b near immediate CABG (LIMA-diag, SVG-LAD).  Cath 2005 with normal LM/LCx/RCA,  LAD,  SVG-LAD, patent LIMA-diag, R-L collaterals   • Diabetes mellitus (HCC)    • Essential hypertension    • Hyperlipidemia    • Ischemic cardiomyopathy     mild, EF 45%   • Left bundle branch block    • Multiple URI        Past Surgical History:   Procedure Laterality Date   • BLADDER SURGERY  12/2017   • CARDIAC CATHETERIZATION N/A 11/29/2021    Procedure: Left Heart Cath;  Surgeon: Valerie Hoover MD;  Location:  [facility] CATH INVASIVE LOCATION;  Service: Cardiovascular;  Laterality: N/A;   • CARDIAC CATHETERIZATION N/A 11/29/2021    Procedure: Left ventriculography;  Surgeon: Valerie Hoover MD;  Location:  [facility] CATH INVASIVE LOCATION;  Service: Cardiovascular;  Laterality: N/A;   • CARDIAC CATHETERIZATION N/A 11/29/2021    Procedure: Coronary angiography;  Surgeon: Valerie Hoover MD;  Location:  [facility] CATH INVASIVE LOCATION;  Service: Cardiovascular;  Laterality: N/A;   • CARDIAC CATHETERIZATION  11/29/2021    Procedure: Saphenous Vein Graft;  Surgeon: Valerie Hoover MD;  Location: [facility] CATH INVASIVE LOCATION;  Service: Cardiovascular;;   • CARDIAC CATHETERIZATION N/A 11/29/2021    Procedure: Native mammary injection;  Surgeon: Valerie Hoover MD;  Location:  [facility] CATH INVASIVE LOCATION;  Service: Cardiovascular;  Laterality: N/A;   • CARDIAC SURGERY      Bypass.   • CATARACT EXTRACTION, BILATERAL     • CORONARY ARTERY BYPASS GRAFT     • HYSTERECTOMY     • PERIPHERAL ARTERIAL STENT GRAFT     • SUBMANDIBULAR GLAND EXCISION         Social History     Socioeconomic History   •  "Marital status:    Tobacco Use   • Smoking status: Never Smoker   • Smokeless tobacco: Never Used   Vaping Use   • Vaping Use: Never used   Substance and Sexual Activity   • Alcohol use: No     Comment: caffeine use: 'not often\"   • Drug use: No       Family History   Problem Relation Age of Onset   • Colon cancer Mother    • Stomach cancer Mother    • Heart disease Father    • Heart failure Father    • Hypertension Sister    • Thyroid disease Sister    • Hypertension Brother        Review of Systems   Constitutional: Positive for malaise/fatigue.   HENT: Positive for hearing loss.    Cardiovascular: Positive for chest pain and dyspnea on exertion. Negative for irregular heartbeat and leg swelling.   Respiratory: Negative for shortness of breath.    Neurological: Negative for dizziness and light-headedness.   All other systems reviewed and are negative.      Allergies   Allergen Reactions   • Contrast Dye Shortness Of Breath   • Morphine And Related Nausea And Vomiting   • Oxycodone Nausea And Vomiting         Current Outpatient Medications:   •  amLODIPine (NORVASC) 5 MG tablet, Take 1 tablet by mouth Daily., Disp: 90 tablet, Rfl: 1  •  aspirin 81 MG tablet, Take 1 tablet by mouth Daily., Disp: 30 tablet, Rfl: 0  •  atorvastatin (LIPITOR) 20 MG tablet, TAKE ONE TABLET BY MOUTH DAILY, Disp: , Rfl:   •  cetirizine (zyrTEC) 10 MG tablet, Take 10 mg by mouth Daily., Disp: , Rfl:   •  estradiol (ESTRACE) 0.1 MG/GM vaginal cream, Insert 2 g into the vagina Daily., Disp: , Rfl:   •  glipiZIDE (GLUCOTROL) 10 MG tablet, Take 10 mg by mouth 2 (Two) Times a Day., Disp: , Rfl:   •  glucose blood test strip, Test Blood Sugar two to three times a day as directed , Diagnosis E11.9, Accu-Check Smartview Test Strips, Disp: 300 each, Rfl: 1  •  isosorbide mononitrate (IMDUR) 60 MG 24 hr tablet, TAKE ONE TABLET BY MOUTH DAILY, Disp: 90 tablet, Rfl: 0  •  lisinopril (PRINIVIL,ZESTRIL) 40 MG tablet, TAKE ONE TABLET BY MOUTH " "DAILY, Disp: 90 tablet, Rfl: 0  •  metFORMIN (GLUCOPHAGE) 1000 MG tablet, Take 1 tablet by mouth 2 (Two) Times a Day With Meals. Resume on 12/1/2021., Disp: 60 tablet, Rfl: 1  •  metoprolol succinate XL (TOPROL-XL) 100 MG 24 hr tablet, Take 1 tablet by mouth Daily. (Patient taking differently: Take 50 mg by mouth Daily.), Disp: 30 tablet, Rfl: 11  •  multivitamin (THERAGRAN) tablet tablet, Take 1 tablet by mouth daily., Disp: 30 tablet, Rfl: 0  •  nitroglycerin (NITROSTAT) 0.4 MG SL tablet, PLACE 1 TABLET UNDER THE TONGUE EVERY 5 MINUTES AS NEEDED FOR CHEST PAIN.NO MORE 3 DOSES IN 15 MINS., Disp: 30 tablet, Rfl: 2  •  polyethyl glycol-propyl glycol (SYSTANE) 0.4-0.3 % solution ophthalmic solution (artificial tears), Administer 1 drop to both eyes Daily., Disp: , Rfl:   •  trimethoprim (TRIMPEX) 100 MG tablet, Take 100 mg by mouth Daily., Disp: , Rfl:      Objective:     Vitals:    12/15/21 1200   BP: 104/72   BP Location: Right arm   Pulse: (!) 43   Weight: 84.2 kg (185 lb 9.6 oz)   Height: 157.5 cm (62\")     Body mass index is 33.95 kg/m².    Physical Exam  Constitutional:       General: She is not in acute distress.  HENT:      Head: Normocephalic.      Nose: Nose normal.      Mouth/Throat:      Comments: Masked  Eyes:      Conjunctiva/sclera: Conjunctivae normal.   Cardiovascular:      Rate and Rhythm: Regular rhythm. Bradycardia present.      Pulses: Normal pulses.      Heart sounds: Normal heart sounds.   Pulmonary:      Effort: Pulmonary effort is normal.      Breath sounds: Normal breath sounds.   Abdominal:      Palpations: Abdomen is soft.      Tenderness: There is no abdominal tenderness.   Musculoskeletal:         General: No swelling.   Skin:     General: Skin is warm and dry.   Neurological:      General: No focal deficit present.      Mental Status: She is alert and oriented to person, place, and time.   Psychiatric:         Mood and Affect: Mood normal.         Behavior: Behavior normal.         " Thought Content: Thought content normal.         ECG 12 Lead    Date/Time: 12/15/2021 1:20 PM  Performed by: Sherwin Robertson MD  Authorized by: Sherwin Robertson MD   Comparison: compared with previous ECG   Similar to previous ECG  Rhythm: sinus bradycardia  Conduction: left bundle branch block  QRS axis: left  Other: no other findings    Clinical impression: abnormal EKG            Assessment:       Diagnosis Plan   1. Coronary artery disease of native artery of native heart with stable angina pectoris (HCC)     2. Ischemic cardiomyopathy     3. Left bundle branch block (LBBB)     4. Symptomatic sinus bradycardia     5. Essential hypertension            Plan:       1.  Coronary Artery Disease  Assessment  • The patient has CCS class II - angina only during vigorous physical activity  • The patient has stable angina   • Continue atorvastatin.    Subjective - Objective  • There is a history of previous coronary artery bypass graft 1999  • Current antiplatelet therapy includes aspirin 81 mg      She had stable CAD by cath in 2021.    2-5.  For years, her ejection fraction was approximately 40%, and her heart rate was approximately 50 bpm.  She developed progressive left ventricular systolic dysfunction associated with acute congestive heart failure last month.  She was diuresed and is now NYHA class II and stable.  However, her heart rate is quite low, despite decreasing her metoprolol dose back to her previous dose.  Her heart rate does not go up when she exercises, and she feels terrible.  I feel that the combination of reduced ejection fraction that is not explained by her coronary disease, a left bundle, and bradycardia makes her a perfect candidate for a biventricular pacemaker.  She and I did discuss defibrillator placement, and she is leaning toward it, and she will discuss this with EP prior to device insertion.    She will remain on metoprolol, 50 mg daily.  I have stopped amlodipine.  She will remain on  lisinopril and isosorbide for now, although after the device is placed, I would like to switch her from lisinopril to sacubitril-valsartan.    Sincerely,       Sherwin Robertson MD

## 2021-12-16 ENCOUNTER — TRANSCRIBE ORDERS (OUTPATIENT)
Dept: CARDIOLOGY | Facility: CLINIC | Age: 71
End: 2021-12-16

## 2021-12-16 DIAGNOSIS — Z01.810 PREPROCEDURAL CARDIOVASCULAR EXAMINATION: ICD-10-CM

## 2021-12-16 DIAGNOSIS — Z01.818 OTHER SPECIFIED PRE-OPERATIVE EXAMINATION: ICD-10-CM

## 2021-12-16 DIAGNOSIS — Z13.6 SCREENING FOR CARDIOVASCULAR CONDITION: Primary | ICD-10-CM

## 2021-12-16 DIAGNOSIS — Z01.818 OTHER SPECIFIED PRE-OPERATIVE EXAMINATION: Primary | ICD-10-CM

## 2021-12-17 ENCOUNTER — TRANSCRIBE ORDERS (OUTPATIENT)
Dept: ADMINISTRATIVE | Facility: HOSPITAL | Age: 71
End: 2021-12-17

## 2021-12-17 ENCOUNTER — APPOINTMENT (OUTPATIENT)
Dept: LAB | Facility: HOSPITAL | Age: 71
End: 2021-12-17

## 2021-12-17 ENCOUNTER — LAB (OUTPATIENT)
Dept: LAB | Facility: HOSPITAL | Age: 71
End: 2021-12-17

## 2021-12-17 ENCOUNTER — READMISSION MANAGEMENT (OUTPATIENT)
Dept: CALL CENTER | Facility: HOSPITAL | Age: 71
End: 2021-12-17

## 2021-12-17 DIAGNOSIS — Z13.6 SCREENING FOR CARDIOVASCULAR CONDITION: ICD-10-CM

## 2021-12-17 DIAGNOSIS — Z13.6 SCREENING FOR ISCHEMIC HEART DISEASE: Primary | ICD-10-CM

## 2021-12-17 DIAGNOSIS — Z01.810 PRE-OPERATIVE CARDIOVASCULAR EXAMINATION: ICD-10-CM

## 2021-12-17 DIAGNOSIS — Z01.818 OTHER SPECIFIED PRE-OPERATIVE EXAMINATION: ICD-10-CM

## 2021-12-17 DIAGNOSIS — Z01.810 PREPROCEDURAL CARDIOVASCULAR EXAMINATION: ICD-10-CM

## 2021-12-17 DIAGNOSIS — Z13.6 SCREENING FOR ISCHEMIC HEART DISEASE: ICD-10-CM

## 2021-12-17 LAB
ANION GAP SERPL CALCULATED.3IONS-SCNC: 10.4 MMOL/L (ref 5–15)
BASOPHILS # BLD AUTO: 0.07 10*3/MM3 (ref 0–0.2)
BASOPHILS NFR BLD AUTO: 1.4 % (ref 0–1.5)
BUN SERPL-MCNC: 13 MG/DL (ref 8–23)
BUN/CREAT SERPL: 16.9 (ref 7–25)
CALCIUM SPEC-SCNC: 9.8 MG/DL (ref 8.6–10.5)
CHLORIDE SERPL-SCNC: 106 MMOL/L (ref 98–107)
CO2 SERPL-SCNC: 23.6 MMOL/L (ref 22–29)
CREAT SERPL-MCNC: 0.77 MG/DL (ref 0.57–1)
DEPRECATED RDW RBC AUTO: 40.6 FL (ref 37–54)
EOSINOPHIL # BLD AUTO: 0.14 10*3/MM3 (ref 0–0.4)
EOSINOPHIL NFR BLD AUTO: 2.7 % (ref 0.3–6.2)
ERYTHROCYTE [DISTWIDTH] IN BLOOD BY AUTOMATED COUNT: 13.3 % (ref 12.3–15.4)
GFR SERPL CREATININE-BSD FRML MDRD: 74 ML/MIN/1.73
GLUCOSE SERPL-MCNC: 152 MG/DL (ref 65–99)
HCT VFR BLD AUTO: 39.7 % (ref 34–46.6)
HGB BLD-MCNC: 13.2 G/DL (ref 12–15.9)
IMM GRANULOCYTES # BLD AUTO: 0.01 10*3/MM3 (ref 0–0.05)
IMM GRANULOCYTES NFR BLD AUTO: 0.2 % (ref 0–0.5)
LYMPHOCYTES # BLD AUTO: 1.73 10*3/MM3 (ref 0.7–3.1)
LYMPHOCYTES NFR BLD AUTO: 33.6 % (ref 19.6–45.3)
MCH RBC QN AUTO: 27.8 PG (ref 26.6–33)
MCHC RBC AUTO-ENTMCNC: 33.2 G/DL (ref 31.5–35.7)
MCV RBC AUTO: 83.8 FL (ref 79–97)
MONOCYTES # BLD AUTO: 0.47 10*3/MM3 (ref 0.1–0.9)
MONOCYTES NFR BLD AUTO: 9.1 % (ref 5–12)
NEUTROPHILS NFR BLD AUTO: 2.73 10*3/MM3 (ref 1.7–7)
NEUTROPHILS NFR BLD AUTO: 53 % (ref 42.7–76)
NRBC BLD AUTO-RTO: 0 /100 WBC (ref 0–0.2)
PLATELET # BLD AUTO: 303 10*3/MM3 (ref 140–450)
PMV BLD AUTO: 10 FL (ref 6–12)
POTASSIUM SERPL-SCNC: 4 MMOL/L (ref 3.5–5.2)
RBC # BLD AUTO: 4.74 10*6/MM3 (ref 3.77–5.28)
SARS-COV-2 RNA RESP QL NAA+PROBE: NOT DETECTED
SODIUM SERPL-SCNC: 140 MMOL/L (ref 136–145)
WBC NRBC COR # BLD: 5.15 10*3/MM3 (ref 3.4–10.8)

## 2021-12-17 PROCEDURE — C9803 HOPD COVID-19 SPEC COLLECT: HCPCS

## 2021-12-17 PROCEDURE — 80048 BASIC METABOLIC PNL TOTAL CA: CPT

## 2021-12-17 PROCEDURE — 85025 COMPLETE CBC W/AUTO DIFF WBC: CPT

## 2021-12-17 PROCEDURE — U0003 INFECTIOUS AGENT DETECTION BY NUCLEIC ACID (DNA OR RNA); SEVERE ACUTE RESPIRATORY SYNDROME CORONAVIRUS 2 (SARS-COV-2) (CORONAVIRUS DISEASE [COVID-19]), AMPLIFIED PROBE TECHNIQUE, MAKING USE OF HIGH THROUGHPUT TECHNOLOGIES AS DESCRIBED BY CMS-2020-01-R: HCPCS

## 2021-12-17 PROCEDURE — U0005 INFEC AGEN DETEC AMPLI PROBE: HCPCS

## 2021-12-17 PROCEDURE — 36415 COLL VENOUS BLD VENIPUNCTURE: CPT

## 2021-12-17 NOTE — OUTREACH NOTE
AMI Week 2 Survey      Responses   [facility] patient discharged from? [place]   Does the patient have one of the following disease processes/diagnoses(primary or secondary)? Acute MI (STEMI,NSTEMI)   Week 2 attempt successful? Yes   Call start time 0954   Call end time 0959   Discharge diagnosis NSTEMI   Meds reviewed with patient/caregiver? Yes   Is the patient having any side effects they believe may be caused by any medication additions or changes? No   Does the patient have all prescriptions related to this admission filled (includes statins,anticoagulants,HTN meds,anti-arrhythmia meds) Yes   Is the patient taking all medications as directed (includes completed medication regime)? Yes   Medication comments Lopressor at 50 mg--HR 40's and getting PPM on Monday   Does the patient have a primary care provider?  Yes   Does the patient have an appointment with their PCP,cardiologist,or clinic within 7 days of discharge? Yes   Has the patient kept scheduled appointments due by today? Yes   Comments PPM on Monday, labs today   Has home health visited the patient within 72 hours of discharge? N/A   Psychosocial issues? No   Did the patient receive a copy of their discharge instructions? Yes   Nursing interventions Reviewed instructions with patient   What is the patient's perception of their health status since discharge? Same   Nursing interventions Nurse provided patient education   Is the patient/caregiver able to teach back signs and symptoms of when to call for help immediately: Sudden chest discomfort,  Sudden discomfort in arms, back, neck or jaw,  Shortness of breath at any time,  Sudden sweating or clammy skin,  Nausea or vomiting,  Dizziness or lightheadedness,  Irregular or rapid heart rate   Nursing interventions Nurse provided patient education   Is the patient/caregiver able to teach back ways to prevent a second heart attack: Follow up with MD,  Take medications   Is the patient/caregiver able  to teach back the hierarchy of who to call/visit for symptoms/problems? PCP, Specialist, Home health nurse, Urgent Care, ED, 911 Yes   Additional teach back comments asymptomatic from bradycardia but getting PPM Monday.    Week 2 call completed? Yes          Jennifer Duran RN

## 2021-12-20 ENCOUNTER — HOSPITAL ENCOUNTER (OUTPATIENT)
Facility: HOSPITAL | Age: 71
Discharge: HOME | End: 2021-12-21
Attending: INTERNAL MEDICINE | Admitting: INTERNAL MEDICINE

## 2021-12-20 ENCOUNTER — APPOINTMENT (OUTPATIENT)
Dept: GENERAL RADIOLOGY | Facility: HOSPITAL | Age: 71
End: 2021-12-20

## 2021-12-20 DIAGNOSIS — I44.7 LEFT BUNDLE BRANCH BLOCK (LBBB): ICD-10-CM

## 2021-12-20 DIAGNOSIS — I25.118 CORONARY ARTERY DISEASE OF NATIVE ARTERY OF NATIVE HEART WITH STABLE ANGINA PECTORIS: ICD-10-CM

## 2021-12-20 DIAGNOSIS — R00.1 SYMPTOMATIC SINUS BRADYCARDIA: ICD-10-CM

## 2021-12-20 DIAGNOSIS — I25.5 ISCHEMIC CARDIOMYOPATHY: ICD-10-CM

## 2021-12-20 LAB
GLUCOSE BLDC GLUCOMTR-MCNC: 132 MG/DL (ref 70–130)
GLUCOSE BLDC GLUCOMTR-MCNC: 357 MG/DL (ref 70–130)
QT INTERVAL: 523 MS
QT INTERVAL: 535 MS

## 2021-12-20 PROCEDURE — 0 IOPAMIDOL PER 1 ML: Performed by: INTERNAL MEDICINE

## 2021-12-20 PROCEDURE — 33249 INSJ/RPLCMT DEFIB W/LEAD(S): CPT | Performed by: INTERNAL MEDICINE

## 2021-12-20 PROCEDURE — 82962 GLUCOSE BLOOD TEST: CPT

## 2021-12-20 PROCEDURE — C1894 INTRO/SHEATH, NON-LASER: HCPCS | Performed by: INTERNAL MEDICINE

## 2021-12-20 PROCEDURE — 25010000002 METHYLPREDNISOLONE PER 125 MG: Performed by: INTERNAL MEDICINE

## 2021-12-20 PROCEDURE — C1769 GUIDE WIRE: HCPCS | Performed by: INTERNAL MEDICINE

## 2021-12-20 PROCEDURE — 33225 L VENTRIC PACING LEAD ADD-ON: CPT | Performed by: INTERNAL MEDICINE

## 2021-12-20 PROCEDURE — 99152 MOD SED SAME PHYS/QHP 5/>YRS: CPT | Performed by: INTERNAL MEDICINE

## 2021-12-20 PROCEDURE — C1887 CATHETER, GUIDING: HCPCS | Performed by: INTERNAL MEDICINE

## 2021-12-20 PROCEDURE — C1898 LEAD, PMKR, OTHER THAN TRANS: HCPCS | Performed by: INTERNAL MEDICINE

## 2021-12-20 PROCEDURE — G0378 HOSPITAL OBSERVATION PER HR: HCPCS

## 2021-12-20 PROCEDURE — 25010000002 VANCOMYCIN 10 G RECONSTITUTED SOLUTION: Performed by: INTERNAL MEDICINE

## 2021-12-20 PROCEDURE — 25010000002 MIDAZOLAM PER 1 MG: Performed by: INTERNAL MEDICINE

## 2021-12-20 PROCEDURE — 93010 ELECTROCARDIOGRAM REPORT: CPT | Performed by: INTERNAL MEDICINE

## 2021-12-20 PROCEDURE — 71045 X-RAY EXAM CHEST 1 VIEW: CPT

## 2021-12-20 PROCEDURE — C1882 AICD, OTHER THAN SING/DUAL: HCPCS | Performed by: INTERNAL MEDICINE

## 2021-12-20 PROCEDURE — C1730 CATH, EP, 19 OR FEW ELECT: HCPCS | Performed by: INTERNAL MEDICINE

## 2021-12-20 PROCEDURE — 63710000001 INSULIN LISPRO (HUMAN) PER 5 UNITS: Performed by: NURSE PRACTITIONER

## 2021-12-20 PROCEDURE — 93005 ELECTROCARDIOGRAM TRACING: CPT | Performed by: INTERNAL MEDICINE

## 2021-12-20 PROCEDURE — 25010000002 FENTANYL CITRATE (PF) 50 MCG/ML SOLUTION: Performed by: INTERNAL MEDICINE

## 2021-12-20 PROCEDURE — C1777 LEAD, AICD, ENDO SINGLE COIL: HCPCS | Performed by: INTERNAL MEDICINE

## 2021-12-20 PROCEDURE — C1900 LEAD, CORONARY VENOUS: HCPCS | Performed by: INTERNAL MEDICINE

## 2021-12-20 PROCEDURE — 99153 MOD SED SAME PHYS/QHP EA: CPT | Performed by: INTERNAL MEDICINE

## 2021-12-20 DEVICE — LD DEFIB SPRINT QUATTRO SECUR S 65: Type: IMPLANTABLE DEVICE | Status: FUNCTIONAL

## 2021-12-20 DEVICE — LD PM CAPSUREFIX NOVUS5076 52CM: Type: IMPLANTABLE DEVICE | Status: FUNCTIONAL

## 2021-12-20 DEVICE — LD ATTAIN/STABILITY/QUAD MRI/SURESCAN OTW 4.4F 88CM: Type: IMPLANTABLE DEVICE | Status: FUNCTIONAL

## 2021-12-20 DEVICE — IMPLANTABLE DEVICE: Type: IMPLANTABLE DEVICE | Status: FUNCTIONAL

## 2021-12-20 RX ORDER — ISOSORBIDE MONONITRATE 30 MG/1
60 TABLET, EXTENDED RELEASE ORAL DAILY
Status: DISCONTINUED | OUTPATIENT
Start: 2021-12-21 | End: 2021-12-21 | Stop reason: HOSPADM

## 2021-12-20 RX ORDER — SODIUM CHLORIDE 0.9 % (FLUSH) 0.9 %
10 SYRINGE (ML) INJECTION EVERY 12 HOURS SCHEDULED
Status: DISCONTINUED | OUTPATIENT
Start: 2021-12-20 | End: 2021-12-21 | Stop reason: HOSPADM

## 2021-12-20 RX ORDER — INSULIN LISPRO 100 [IU]/ML
0-9 INJECTION, SOLUTION INTRAVENOUS; SUBCUTANEOUS
Status: DISCONTINUED | OUTPATIENT
Start: 2021-12-21 | End: 2021-12-20

## 2021-12-20 RX ORDER — LISINOPRIL 20 MG/1
40 TABLET ORAL DAILY
Status: DISCONTINUED | OUTPATIENT
Start: 2021-12-21 | End: 2021-12-21 | Stop reason: HOSPADM

## 2021-12-20 RX ORDER — SODIUM CHLORIDE 0.9 % (FLUSH) 0.9 %
10 SYRINGE (ML) INJECTION AS NEEDED
Status: DISCONTINUED | OUTPATIENT
Start: 2021-12-20 | End: 2021-12-20 | Stop reason: HOSPADM

## 2021-12-20 RX ORDER — ACETAMINOPHEN 325 MG/1
650 TABLET ORAL EVERY 4 HOURS PRN
Status: DISCONTINUED | OUTPATIENT
Start: 2021-12-20 | End: 2021-12-21 | Stop reason: HOSPADM

## 2021-12-20 RX ORDER — AMLODIPINE BESYLATE 5 MG/1
5 TABLET ORAL DAILY
Status: DISCONTINUED | OUTPATIENT
Start: 2021-12-21 | End: 2021-12-21 | Stop reason: HOSPADM

## 2021-12-20 RX ORDER — SODIUM CHLORIDE 0.9 % (FLUSH) 0.9 %
10 SYRINGE (ML) INJECTION AS NEEDED
Status: DISCONTINUED | OUTPATIENT
Start: 2021-12-20 | End: 2021-12-21 | Stop reason: HOSPADM

## 2021-12-20 RX ORDER — ASPIRIN 81 MG/1
81 TABLET ORAL DAILY
Status: DISCONTINUED | OUTPATIENT
Start: 2021-12-21 | End: 2021-12-21 | Stop reason: HOSPADM

## 2021-12-20 RX ORDER — DEXTROSE MONOHYDRATE 25 G/50ML
25 INJECTION, SOLUTION INTRAVENOUS
Status: DISCONTINUED | OUTPATIENT
Start: 2021-12-20 | End: 2021-12-21 | Stop reason: HOSPADM

## 2021-12-20 RX ORDER — METHYLPREDNISOLONE SODIUM SUCCINATE 125 MG/2ML
125 INJECTION, POWDER, LYOPHILIZED, FOR SOLUTION INTRAMUSCULAR; INTRAVENOUS ONCE
Status: COMPLETED | OUTPATIENT
Start: 2021-12-20 | End: 2021-12-20

## 2021-12-20 RX ORDER — LIDOCAINE HYDROCHLORIDE AND EPINEPHRINE 10; 10 MG/ML; UG/ML
INJECTION, SOLUTION INFILTRATION; PERINEURAL AS NEEDED
Status: DISCONTINUED | OUTPATIENT
Start: 2021-12-20 | End: 2021-12-20 | Stop reason: HOSPADM

## 2021-12-20 RX ORDER — NITROGLYCERIN 0.4 MG/1
0.4 TABLET SUBLINGUAL
Status: DISCONTINUED | OUTPATIENT
Start: 2021-12-20 | End: 2021-12-21 | Stop reason: HOSPADM

## 2021-12-20 RX ORDER — NALOXONE HCL 0.4 MG/ML
0.4 VIAL (ML) INJECTION
Status: DISCONTINUED | OUTPATIENT
Start: 2021-12-20 | End: 2021-12-21 | Stop reason: HOSPADM

## 2021-12-20 RX ORDER — HYDROMORPHONE HYDROCHLORIDE 1 MG/ML
0.5 INJECTION, SOLUTION INTRAMUSCULAR; INTRAVENOUS; SUBCUTANEOUS
Status: DISCONTINUED | OUTPATIENT
Start: 2021-12-20 | End: 2021-12-21 | Stop reason: HOSPADM

## 2021-12-20 RX ORDER — GLIPIZIDE 10 MG/1
10 TABLET ORAL 2 TIMES DAILY
Status: DISCONTINUED | OUTPATIENT
Start: 2021-12-20 | End: 2021-12-21 | Stop reason: HOSPADM

## 2021-12-20 RX ORDER — FENTANYL CITRATE 50 UG/ML
INJECTION, SOLUTION INTRAMUSCULAR; INTRAVENOUS AS NEEDED
Status: DISCONTINUED | OUTPATIENT
Start: 2021-12-20 | End: 2021-12-20 | Stop reason: HOSPADM

## 2021-12-20 RX ORDER — ACETAMINOPHEN 650 MG/1
650 SUPPOSITORY RECTAL EVERY 4 HOURS PRN
Status: DISCONTINUED | OUTPATIENT
Start: 2021-12-20 | End: 2021-12-21 | Stop reason: HOSPADM

## 2021-12-20 RX ORDER — SODIUM CHLORIDE 0.9 % (FLUSH) 0.9 %
3 SYRINGE (ML) INJECTION EVERY 12 HOURS SCHEDULED
Status: DISCONTINUED | OUTPATIENT
Start: 2021-12-20 | End: 2021-12-20 | Stop reason: HOSPADM

## 2021-12-20 RX ORDER — INSULIN LISPRO 100 [IU]/ML
0-9 INJECTION, SOLUTION INTRAVENOUS; SUBCUTANEOUS
Status: DISCONTINUED | OUTPATIENT
Start: 2021-12-20 | End: 2021-12-21 | Stop reason: HOSPADM

## 2021-12-20 RX ORDER — NICOTINE POLACRILEX 4 MG
15 LOZENGE BUCCAL
Status: DISCONTINUED | OUTPATIENT
Start: 2021-12-20 | End: 2021-12-21 | Stop reason: HOSPADM

## 2021-12-20 RX ORDER — HYDROCODONE BITARTRATE AND ACETAMINOPHEN 5; 325 MG/1; MG/1
1 TABLET ORAL EVERY 4 HOURS PRN
Status: DISCONTINUED | OUTPATIENT
Start: 2021-12-20 | End: 2021-12-21 | Stop reason: HOSPADM

## 2021-12-20 RX ORDER — SODIUM CHLORIDE 9 MG/ML
75 INJECTION, SOLUTION INTRAVENOUS CONTINUOUS
Status: DISCONTINUED | OUTPATIENT
Start: 2021-12-20 | End: 2021-12-20

## 2021-12-20 RX ORDER — MIDAZOLAM HYDROCHLORIDE 1 MG/ML
INJECTION INTRAMUSCULAR; INTRAVENOUS AS NEEDED
Status: DISCONTINUED | OUTPATIENT
Start: 2021-12-20 | End: 2021-12-20 | Stop reason: HOSPADM

## 2021-12-20 RX ORDER — METOPROLOL SUCCINATE 50 MG/1
50 TABLET, EXTENDED RELEASE ORAL DAILY
Status: DISCONTINUED | OUTPATIENT
Start: 2021-12-21 | End: 2021-12-21 | Stop reason: HOSPADM

## 2021-12-20 RX ADMIN — SODIUM CHLORIDE 75 ML/HR: 9 INJECTION, SOLUTION INTRAVENOUS at 13:43

## 2021-12-20 RX ADMIN — HYDROCODONE BITARTRATE AND ACETAMINOPHEN 1 TABLET: 5; 325 TABLET ORAL at 19:39

## 2021-12-20 RX ADMIN — SODIUM CHLORIDE, PRESERVATIVE FREE 10 ML: 5 INJECTION INTRAVENOUS at 20:37

## 2021-12-20 RX ADMIN — INSULIN LISPRO 8 UNITS: 100 INJECTION, SOLUTION INTRAVENOUS; SUBCUTANEOUS at 21:39

## 2021-12-20 RX ADMIN — VANCOMYCIN HYDROCHLORIDE 1250 MG: 10 INJECTION, POWDER, LYOPHILIZED, FOR SOLUTION INTRAVENOUS at 13:43

## 2021-12-20 RX ADMIN — GLIPIZIDE 10 MG: 10 TABLET ORAL at 20:37

## 2021-12-20 RX ADMIN — METHYLPREDNISOLONE SODIUM SUCCINATE 125 MG: 125 INJECTION, POWDER, FOR SOLUTION INTRAMUSCULAR; INTRAVENOUS at 14:26

## 2021-12-21 ENCOUNTER — READMISSION MANAGEMENT (OUTPATIENT)
Dept: CALL CENTER | Facility: HOSPITAL | Age: 71
End: 2021-12-21

## 2021-12-21 VITALS
BODY MASS INDEX: 34.04 KG/M2 | SYSTOLIC BLOOD PRESSURE: 141 MMHG | WEIGHT: 185 LBS | HEIGHT: 62 IN | RESPIRATION RATE: 16 BRPM | DIASTOLIC BLOOD PRESSURE: 97 MMHG | HEART RATE: 69 BPM | OXYGEN SATURATION: 96 % | TEMPERATURE: 97.8 F

## 2021-12-21 LAB — GLUCOSE BLDC GLUCOMTR-MCNC: 227 MG/DL (ref 70–130)

## 2021-12-21 PROCEDURE — 93005 ELECTROCARDIOGRAM TRACING: CPT | Performed by: INTERNAL MEDICINE

## 2021-12-21 PROCEDURE — G0378 HOSPITAL OBSERVATION PER HR: HCPCS

## 2021-12-21 PROCEDURE — 82962 GLUCOSE BLOOD TEST: CPT

## 2021-12-21 PROCEDURE — 93010 ELECTROCARDIOGRAM REPORT: CPT | Performed by: INTERNAL MEDICINE

## 2021-12-21 PROCEDURE — 63710000001 INSULIN LISPRO (HUMAN) PER 5 UNITS: Performed by: NURSE PRACTITIONER

## 2021-12-21 PROCEDURE — 99024 POSTOP FOLLOW-UP VISIT: CPT | Performed by: INTERNAL MEDICINE

## 2021-12-21 RX ADMIN — LISINOPRIL 40 MG: 20 TABLET ORAL at 08:49

## 2021-12-21 RX ADMIN — INSULIN LISPRO 4 UNITS: 100 INJECTION, SOLUTION INTRAVENOUS; SUBCUTANEOUS at 06:21

## 2021-12-21 RX ADMIN — ISOSORBIDE MONONITRATE 60 MG: 30 TABLET ORAL at 08:49

## 2021-12-21 RX ADMIN — AMLODIPINE BESYLATE 5 MG: 5 TABLET ORAL at 08:49

## 2021-12-21 RX ADMIN — SODIUM CHLORIDE, PRESERVATIVE FREE 10 ML: 5 INJECTION INTRAVENOUS at 08:50

## 2021-12-21 RX ADMIN — HYDROCODONE BITARTRATE AND ACETAMINOPHEN 1 TABLET: 5; 325 TABLET ORAL at 00:05

## 2021-12-21 RX ADMIN — METOPROLOL SUCCINATE 50 MG: 50 TABLET, EXTENDED RELEASE ORAL at 08:49

## 2021-12-21 RX ADMIN — HYDROCODONE BITARTRATE AND ACETAMINOPHEN 1 TABLET: 5; 325 TABLET ORAL at 06:21

## 2021-12-21 RX ADMIN — GLIPIZIDE 10 MG: 10 TABLET ORAL at 08:49

## 2021-12-21 RX ADMIN — ASPIRIN 81 MG: 81 TABLET, COATED ORAL at 08:53

## 2021-12-21 NOTE — OUTREACH NOTE
AMI Week 3 Survey      Responses   [facility] patient discharged from? [place]   Does the patient have one of the following disease processes/diagnoses(primary or secondary)? Acute MI (STEMI,NSTEMI)   Week 3 attempt successful? No   Revoke Readmitted          Joceline Issa RN

## 2021-12-22 ENCOUNTER — TELEPHONE (OUTPATIENT)
Dept: CARDIOLOGY | Facility: CLINIC | Age: 71
End: 2021-12-22

## 2021-12-22 LAB — QT INTERVAL: 573 MS

## 2021-12-22 NOTE — TELEPHONE ENCOUNTER
Patient left voicemail needing to r/s her 1/25 appt to a Wednesday appt.  Please call and schedule her on 2/2 @ 10:15 with device check.    THANK YOU!

## 2021-12-29 ENCOUNTER — CLINICAL SUPPORT NO REQUIREMENTS (OUTPATIENT)
Dept: CARDIOLOGY | Facility: CLINIC | Age: 71
End: 2021-12-29

## 2021-12-29 DIAGNOSIS — I25.5 ISCHEMIC CARDIOMYOPATHY: Primary | ICD-10-CM

## 2021-12-29 PROCEDURE — 93284 PRGRMG EVAL IMPLANTABLE DFB: CPT | Performed by: INTERNAL MEDICINE

## 2022-02-02 ENCOUNTER — CLINICAL SUPPORT NO REQUIREMENTS (OUTPATIENT)
Dept: CARDIOLOGY | Facility: CLINIC | Age: 72
End: 2022-02-02

## 2022-02-02 ENCOUNTER — OFFICE VISIT (OUTPATIENT)
Dept: CARDIOLOGY | Facility: CLINIC | Age: 72
End: 2022-02-02

## 2022-02-02 VITALS
DIASTOLIC BLOOD PRESSURE: 90 MMHG | HEART RATE: 71 BPM | WEIGHT: 185 LBS | SYSTOLIC BLOOD PRESSURE: 134 MMHG | HEIGHT: 62 IN | BODY MASS INDEX: 34.04 KG/M2

## 2022-02-02 DIAGNOSIS — R00.1 SINUS BRADYCARDIA: ICD-10-CM

## 2022-02-02 DIAGNOSIS — Z95.810 PRESENCE OF BIVENTRICULAR IMPLANTABLE CARDIOVERTER-DEFIBRILLATOR (ICD): ICD-10-CM

## 2022-02-02 DIAGNOSIS — I44.7 LEFT BUNDLE BRANCH BLOCK (LBBB): Primary | ICD-10-CM

## 2022-02-02 DIAGNOSIS — I25.5 ISCHEMIC CARDIOMYOPATHY: Primary | ICD-10-CM

## 2022-02-02 DIAGNOSIS — I10 ESSENTIAL HYPERTENSION: ICD-10-CM

## 2022-02-02 DIAGNOSIS — I25.10 CORONARY ARTERY DISEASE INVOLVING NATIVE CORONARY ARTERY OF NATIVE HEART WITHOUT ANGINA PECTORIS: ICD-10-CM

## 2022-02-02 DIAGNOSIS — I44.7 LEFT BUNDLE BRANCH BLOCK (LBBB): ICD-10-CM

## 2022-02-02 PROCEDURE — 93000 ELECTROCARDIOGRAM COMPLETE: CPT | Performed by: NURSE PRACTITIONER

## 2022-02-02 PROCEDURE — 99024 POSTOP FOLLOW-UP VISIT: CPT | Performed by: NURSE PRACTITIONER

## 2022-02-02 NOTE — PROGRESS NOTES
Date of Office Visit: 2022  Encounter Provider: GALO Condon  Place of Service: [facility] CARDIOLOGY  Patient Name: Petra Soto  :1950    Chief Complaint   Patient presents with   • Cardiomyopathy     1 month BHE f/u   • LBBB   • Pacemaker Check   :     HPI: Petra Soto is a 71 y.o. female who is a patient of Dr. Robertson with ischemic cardiomyopathy (EF 26-30% 2021), CAD, s/p CABG, bradycardia, systolic HF and left bundle branch block referred to Dr. Peña for evaluation for CRT.  She was felt to be a good candidate and underwent implantation of a CRT-D on .    She presents today for post procedure follow up and device check.     She is doing well.  She says she could definitely tell that she feels better since the device implant.    She denies chest pain, dyspnea, PND, orthopnea, dizziness or edema.    Device interrogation shows normal testing and function with 98%          Past Medical History:   Diagnosis Date   • Carotid atherosclerosis     nonosbstructive, minimal by US 3/2016 (<15% bilaterally)   • Coronary artery disease     MI , s/p PCI-LAD vs diagonal (3*13mm Duet), f/b near immediate CABG (LIMA-diag, SVG-LAD).  Cath  with normal LM/LCx/RCA,  LAD,  SVG-LAD, patent LIMA-diag, R-L collaterals   • Diabetes mellitus (HCC)    • Essential hypertension    • Hyperlipidemia    • Ischemic cardiomyopathy     mild, EF 45%   • Left bundle branch block    • Multiple URI    • Symptomatic sinus bradycardia        Past Surgical History:   Procedure Laterality Date   • BLADDER SURGERY  2017   • CARDIAC CATHETERIZATION N/A 2021    Procedure: Left Heart Cath;  Surgeon: Valerie Hoover MD;  Location:  [facility] CATH INVASIVE LOCATION;  Service: Cardiovascular;  Laterality: N/A;   • CARDIAC CATHETERIZATION N/A 2021    Procedure: Left ventriculography;  Surgeon: Valerie Hoover MD;  Location:  [facility] CATH INVASIVE LOCATION;  Service:  "Cardiovascular;  Laterality: N/A;   • CARDIAC CATHETERIZATION N/A 11/29/2021    Procedure: Coronary angiography;  Surgeon: Valerie Hoover MD;  Location:  [facility] CATH INVASIVE LOCATION;  Service: Cardiovascular;  Laterality: N/A;   • CARDIAC CATHETERIZATION  11/29/2021    Procedure: Saphenous Vein Graft;  Surgeon: Valerie Hoover MD;  Location:  [facility] CATH INVASIVE LOCATION;  Service: Cardiovascular;;   • CARDIAC CATHETERIZATION N/A 11/29/2021    Procedure: Native mammary injection;  Surgeon: Valerie Hoover MD;  Location:  [facility] CATH INVASIVE LOCATION;  Service: Cardiovascular;  Laterality: N/A;   • CARDIAC ELECTROPHYSIOLOGY PROCEDURE N/A 12/20/2021    Procedure: Biventricular Device Insertion- BIV ICD- MEDTRONIC;  Surgeon: Noel Peña MD;  Location:  [facility] CATH INVASIVE LOCATION;  Service: Cardiovascular;  Laterality: N/A;   • CATARACT EXTRACTION, BILATERAL     • CORONARY ARTERY BYPASS GRAFT     • HYSTERECTOMY     • PERIPHERAL ARTERIAL STENT GRAFT     • SUBMANDIBULAR GLAND EXCISION         Social History     Socioeconomic History   • Marital status:    Tobacco Use   • Smoking status: Never Smoker   • Smokeless tobacco: Never Used   Vaping Use   • Vaping Use: Never used   Substance and Sexual Activity   • Alcohol use: No     Comment: caffeine use: 'not often\"   • Drug use: No   • Sexual activity: Defer       Family History   Problem Relation Age of Onset   • Colon cancer Mother    • Stomach cancer Mother    • Heart disease Father    • Heart failure Father    • Hypertension Sister    • Thyroid disease Sister    • Hypertension Brother        Review of Systems   Constitutional: Negative for chills, fever and malaise/fatigue.   Cardiovascular: Negative for chest pain, dyspnea on exertion, leg swelling, near-syncope, orthopnea, palpitations, paroxysmal nocturnal dyspnea and syncope.   Respiratory: Negative for cough and shortness of breath.    Musculoskeletal: Negative for joint pain, joint swelling and " myalgias.   Gastrointestinal: Negative for abdominal pain, diarrhea, melena, nausea and vomiting.   Genitourinary: Negative for frequency and hematuria.   Neurological: Negative for light-headedness, numbness, paresthesias and seizures.   Allergic/Immunologic: Negative.    All other systems reviewed and are negative.      Allergies   Allergen Reactions   • Contrast Dye Shortness Of Breath   • Morphine And Related Nausea And Vomiting   • Oxycodone Nausea And Vomiting         Current Outpatient Medications:   •  amLODIPine (NORVASC) 5 MG tablet, Take 1 tablet by mouth Daily., Disp: 90 tablet, Rfl: 1  •  aspirin 81 MG tablet, Take 1 tablet by mouth Daily., Disp: 30 tablet, Rfl: 0  •  atorvastatin (LIPITOR) 20 MG tablet, TAKE ONE TABLET BY MOUTH DAILY, Disp: , Rfl:   •  Calcium Citrate-Vitamin D (CALCIUM CITRATE + D3 PO), Take 1 tablet by mouth Daily., Disp: , Rfl:   •  cetirizine (zyrTEC) 10 MG tablet, Take 10 mg by mouth Daily., Disp: , Rfl:   •  estradiol (ESTRACE) 0.1 MG/GM vaginal cream, Insert  into the vagina 3 (Three) Times a Week., Disp: , Rfl:   •  glipiZIDE (GLUCOTROL) 10 MG tablet, Take 10 mg by mouth 2 (Two) Times a Day., Disp: , Rfl:   •  glucose blood test strip, Test Blood Sugar two to three times a day as directed , Diagnosis E11.9, Accu-Check Smartview Test Strips, Disp: 300 each, Rfl: 1  •  isosorbide mononitrate (IMDUR) 60 MG 24 hr tablet, TAKE ONE TABLET BY MOUTH DAILY, Disp: 90 tablet, Rfl: 0  •  lisinopril (PRINIVIL,ZESTRIL) 40 MG tablet, TAKE ONE TABLET BY MOUTH DAILY, Disp: 90 tablet, Rfl: 0  •  metFORMIN (GLUCOPHAGE) 1000 MG tablet, Take 1 tablet by mouth 2 (Two) Times a Day With Meals., Disp: 60 tablet, Rfl: 0  •  metoprolol succinate XL (TOPROL-XL) 100 MG 24 hr tablet, Take 1 tablet by mouth Daily., Disp: 30 tablet, Rfl: 11  •  multivitamin (THERAGRAN) tablet tablet, Take 1 tablet by mouth daily., Disp: 30 tablet, Rfl: 0  •  nitroglycerin (NITROSTAT) 0.4 MG SL tablet, PLACE 1 TABLET UNDER THE  "TONGUE EVERY 5 MINUTES AS NEEDED FOR CHEST PAIN.NO MORE 3 DOSES IN 15 MINS., Disp: 30 tablet, Rfl: 2  •  polyethyl glycol-propyl glycol (SYSTANE) 0.4-0.3 % solution ophthalmic solution (artificial tears), Administer 1 drop to both eyes Daily., Disp: , Rfl:   •  trimethoprim (TRIMPEX) 100 MG tablet, Take 100 mg by mouth Daily., Disp: , Rfl:       Objective:     Vitals:    02/02/22 1002   BP: 134/90   Pulse: 71   Weight: 83.9 kg (185 lb)   Height: 157.5 cm (62\")     Body mass index is 33.84 kg/m².    PHYSICAL EXAM:    Vitals Reviewed.   General Appearance: No acute distress, well developed and well nourished.   Eyes: Conjunctiva and lids: No erythema, swelling, or discharge. Sclera non-icteric.   HENT: Atraumatic, normocephalic. External eyes, ears, and nose normal.   Respiratory: No signs of respiratory distress. Respiration rhythm and depth normal.   Clear to auscultation. No rales, crackles, rhonchi, or wheezing auscultated.   Cardiovascular:  Heart Rate and Rhythm: Normal, Heart Sounds: Normal S1 and S2. No S3 or S4 noted.  Murmurs: No murmurs noted. No rubs, thrills, or gallops.   Arterial Pulses:  Posterior tibialis and dorsalis pedis pulses normal.   Lower Extremities: No edema noted.  Gastrointestinal:  Abdomen soft, non-distended, non-tender.   Musculoskeletal: Normal movement of extremities  Skin: Warm and dry.   Psychiatric: Patient alert and oriented to person, place, and time. Speech and behavior appropriate. Normal mood and affect.       ECG 12 Lead    Date/Time: 2/2/2022 11:01 AM  Performed by: Concepción Pack APRN  Authorized by: Concepción Pack APRN   Comparison: compared with previous ECG   Similar to previous ECG  Rhythm: paced  BPM: 71  Pacing: dual chamber pacing and 100% capture            Assessment:       Diagnosis Plan   1. Ischemic cardiomyopathy     2. Left bundle branch block (LBBB)     3. Sinus bradycardia     4. Presence of biventricular implantable cardioverter-defibrillator (ICD)     5. " Coronary artery disease involving native coronary artery of native heart without angina pectoris     6. Essential hypertension            Plan:       1.-4. ICM, LBBB, SB, s/p CRT-D. She feels much improved---no dyspnea or edema, euvolemic by exam.     5. CAD, s/p CABG--no angina.     6. HTN, controlled.     Follow up with Dr. Peña at the end of March with device check as scheduled, follow up with Dr. Robertson in 6 months.     As always, it has been a pleasure to participate in your patient's care.      Sincerely,         GALO Bay

## 2022-03-30 ENCOUNTER — OFFICE VISIT (OUTPATIENT)
Dept: CARDIOLOGY | Facility: CLINIC | Age: 72
End: 2022-03-30

## 2022-03-30 ENCOUNTER — CLINICAL SUPPORT NO REQUIREMENTS (OUTPATIENT)
Dept: CARDIOLOGY | Facility: CLINIC | Age: 72
End: 2022-03-30

## 2022-03-30 VITALS
WEIGHT: 190 LBS | BODY MASS INDEX: 34.96 KG/M2 | DIASTOLIC BLOOD PRESSURE: 80 MMHG | HEIGHT: 62 IN | HEART RATE: 80 BPM | SYSTOLIC BLOOD PRESSURE: 126 MMHG

## 2022-03-30 DIAGNOSIS — I25.5 ISCHEMIC CARDIOMYOPATHY: Primary | ICD-10-CM

## 2022-03-30 DIAGNOSIS — R00.1 SINUS BRADYCARDIA: ICD-10-CM

## 2022-03-30 DIAGNOSIS — I44.7 LEFT BUNDLE BRANCH BLOCK (LBBB): ICD-10-CM

## 2022-03-30 DIAGNOSIS — I25.5 ISCHEMIC CARDIOMYOPATHY: ICD-10-CM

## 2022-03-30 DIAGNOSIS — Z95.810 PRESENCE OF BIVENTRICULAR IMPLANTABLE CARDIOVERTER-DEFIBRILLATOR (ICD): Primary | ICD-10-CM

## 2022-03-30 PROCEDURE — 93000 ELECTROCARDIOGRAM COMPLETE: CPT | Performed by: INTERNAL MEDICINE

## 2022-03-30 PROCEDURE — 93290 INTERROG DEV EVAL ICPMS IP: CPT | Performed by: INTERNAL MEDICINE

## 2022-03-30 PROCEDURE — 93284 PRGRMG EVAL IMPLANTABLE DFB: CPT | Performed by: INTERNAL MEDICINE

## 2022-03-30 PROCEDURE — 99214 OFFICE O/P EST MOD 30 MIN: CPT | Performed by: INTERNAL MEDICINE

## 2022-03-30 RX ORDER — SITAGLIPTIN 50 MG/1
100 TABLET, FILM COATED ORAL DAILY
COMMUNITY
Start: 2022-03-22 | End: 2022-10-05 | Stop reason: DRUGHIGH

## 2022-03-30 RX ORDER — PIOGLITAZONEHYDROCHLORIDE 15 MG/1
15 TABLET ORAL DAILY
COMMUNITY
Start: 2022-03-01 | End: 2022-08-10 | Stop reason: ALTCHOICE

## 2022-03-30 NOTE — PROGRESS NOTES
"Date of Office Visit: 2022  Encounter Provider: Noel Peña MD  Place of Service: [facility] CARDIOLOGY  Patient Name: Petra Soto  : 1950    Subjective:     Encounter Date:2022      Patient ID: Petra Soto is a 71 y.o. female who has a cc of  Ischemic CM and sb and I put in CRT-D in in Dec     Much better. Now.     \  No anginal chest pain,   No sig bland,   No soa,   No fainting,  No orthostasis.   No edema.   Exercise tolerance: she walks most days.     There have been no hospital admission since the last visit.     There have been no bleeding events.       Past Medical History:   Diagnosis Date   • Carotid atherosclerosis     nonosbstructive, minimal by US 3/2016 (<15% bilaterally)   • Coronary artery disease     MI , s/p PCI-LAD vs diagonal (3*13mm Duet), f/b near immediate CABG (LIMA-diag, SVG-LAD).  Cath  with normal LM/LCx/RCA,  LAD,  SVG-LAD, patent LIMA-diag, R-L collaterals   • Diabetes mellitus (HCC)    • Essential hypertension    • Hyperlipidemia    • Ischemic cardiomyopathy     mild, EF 45%   • Left bundle branch block    • Multiple URI    • Symptomatic sinus bradycardia        Social History     Socioeconomic History   • Marital status:    Tobacco Use   • Smoking status: Never Smoker   • Smokeless tobacco: Never Used   Vaping Use   • Vaping Use: Never used   Substance and Sexual Activity   • Alcohol use: No     Comment: caffeine use: 'not often\"   • Drug use: No   • Sexual activity: Defer       Family History   Problem Relation Age of Onset   • Colon cancer Mother    • Stomach cancer Mother    • Heart disease Father    • Heart failure Father    • Hypertension Sister    • Thyroid disease Sister    • Hypertension Brother        Review of Systems   Constitutional: Negative for fever and night sweats.   HENT: Negative for ear pain and stridor.    Eyes: Negative for discharge and visual halos.   Cardiovascular: Negative for cyanosis. " "  Respiratory: Negative for hemoptysis and sputum production.    Hematologic/Lymphatic: Negative for adenopathy.   Skin: Negative for nail changes and unusual hair distribution.   Musculoskeletal: Negative for gout and joint swelling.   Gastrointestinal: Negative for bowel incontinence and flatus.   Genitourinary: Negative for dysuria and flank pain.   Neurological: Negative for seizures and tremors.   Psychiatric/Behavioral: Negative for altered mental status. The patient is not nervous/anxious.             Objective:     Vitals:    03/30/22 0835   BP: 126/80   BP Location: Right arm   Patient Position: Sitting   Cuff Size: Large Adult   Pulse: 80   Weight: 86.2 kg (190 lb)   Height: 157.5 cm (62\")         Eyes:      General:         Right eye: No discharge.         Left eye: No discharge.   HENT:      Head: Normocephalic and atraumatic.   Neck:      Thyroid: No thyromegaly.      Vascular: No JVD.   Pulmonary:      Effort: Pulmonary effort is normal.      Breath sounds: Normal breath sounds. No rales.   Cardiovascular:      Normal rate. Regular rhythm.      No gallop.   Edema:     Peripheral edema absent.   Abdominal:      General: Bowel sounds are normal.      Palpations: Abdomen is soft.      Tenderness: There is no abdominal tenderness.   Musculoskeletal: Normal range of motion.         General: No deformity. Skin:     General: Skin is warm and dry.      Findings: No erythema.   Neurological:      Mental Status: Alert and oriented to person, place, and time.      Motor: Normal muscle tone.   Psychiatric:         Behavior: Behavior normal.         Thought Content: Thought content normal.           ECG 12 Lead    Date/Time: 3/30/2022 9:20 AM  Performed by: Noel Peña MD  Authorized by: Noel Peña MD   Comparison: compared with previous ECG   Similar to previous ECG  Rhythm: paced            Lab Review:       Assessment:          Diagnosis Plan   1. Presence of biventricular implantable " cardioverter-defibrillator (ICD)     2. Sinus bradycardia     3. Left bundle branch block (LBBB)     4. Ischemic cardiomyopathy            Plan:     Ischemic CM - much improved since implant of CRT_D     I reviewed the pacemaker/ICD tracings and the pacing and sensing parameters are normal.  AF burden is 0     I wonder whether an SGLT2i -- would be good.

## 2022-03-31 PROCEDURE — 93296 REM INTERROG EVL PM/IDS: CPT | Performed by: INTERNAL MEDICINE

## 2022-03-31 PROCEDURE — 93295 DEV INTERROG REMOTE 1/2/MLT: CPT | Performed by: INTERNAL MEDICINE

## 2022-04-07 ENCOUNTER — TELEPHONE (OUTPATIENT)
Dept: CARDIOLOGY | Facility: CLINIC | Age: 72
End: 2022-04-07

## 2022-04-07 NOTE — TELEPHONE ENCOUNTER
Dr. Robertson,    Called and spoke to patient. Went over your recommendations. She said Dr. Flores manages her diabetes. I attempted to call Dr. Flores's office a few times, but could never get anyone to answer the phone. I will keep trying to reach her.    Thank you,    Juliet Berrios, RN  Triage [facility] [place]

## 2022-04-08 NOTE — TELEPHONE ENCOUNTER
Called and spoke with Dr. Flores's office. They are going to get the message to Dr. Flores and call us back with her decision.    Thank you,    Juliet Berrios RN  Triage [facility] [place]

## 2022-04-13 ENCOUNTER — HOSPITAL ENCOUNTER (OUTPATIENT)
Dept: CARDIOLOGY | Facility: HOSPITAL | Age: 72
Discharge: HOME | End: 2022-04-13
Admitting: INTERNAL MEDICINE

## 2022-04-13 VITALS
HEART RATE: 78 BPM | OXYGEN SATURATION: 96 % | DIASTOLIC BLOOD PRESSURE: 82 MMHG | WEIGHT: 190 LBS | HEIGHT: 62 IN | BODY MASS INDEX: 34.96 KG/M2 | SYSTOLIC BLOOD PRESSURE: 128 MMHG

## 2022-04-13 DIAGNOSIS — I25.5 ISCHEMIC CARDIOMYOPATHY: ICD-10-CM

## 2022-04-13 LAB
ASCENDING AORTA: 3.7 CM
BH CV ECHO MEAS - ACS: 1.85 CM
BH CV ECHO MEAS - AO MAX PG: 10.1 MMHG
BH CV ECHO MEAS - AO MEAN PG: 5 MMHG
BH CV ECHO MEAS - AO ROOT DIAM: 3 CM
BH CV ECHO MEAS - AO V2 MAX: 159 CM/SEC
BH CV ECHO MEAS - AO V2 VTI: 29 CM
BH CV ECHO MEAS - AVA(I,D): 2.24 CM2
BH CV ECHO MEAS - EDV(CUBED): 34.2 ML
BH CV ECHO MEAS - EDV(MOD-SP2): 109 ML
BH CV ECHO MEAS - EDV(MOD-SP4): 124 ML
BH CV ECHO MEAS - EF(MOD-BP): 49.8 %
BH CV ECHO MEAS - EF(MOD-SP2): 47.7 %
BH CV ECHO MEAS - EF(MOD-SP4): 48.4 %
BH CV ECHO MEAS - ESV(CUBED): 27 ML
BH CV ECHO MEAS - ESV(MOD-SP2): 57 ML
BH CV ECHO MEAS - ESV(MOD-SP4): 64 ML
BH CV ECHO MEAS - FS: 7.6 %
BH CV ECHO MEAS - IVS/LVPW: 1.06 CM
BH CV ECHO MEAS - IVSD: 1.54 CM
BH CV ECHO MEAS - LAT PEAK E' VEL: 11 CM/SEC
BH CV ECHO MEAS - LV DIASTOLIC VOL/BSA (35-75): 66.3 CM2
BH CV ECHO MEAS - LV MASS(C)D: 173.9 GRAMS
BH CV ECHO MEAS - LV MAX PG: 4.9 MMHG
BH CV ECHO MEAS - LV MEAN PG: 3 MMHG
BH CV ECHO MEAS - LV SYSTOLIC VOL/BSA (12-30): 34.2 CM2
BH CV ECHO MEAS - LV V1 MAX: 110.5 CM/SEC
BH CV ECHO MEAS - LV V1 VTI: 19.3 CM
BH CV ECHO MEAS - LVIDD: 3.2 CM
BH CV ECHO MEAS - LVIDS: 3 CM
BH CV ECHO MEAS - LVOT AREA: 3.4 CM2
BH CV ECHO MEAS - LVOT DIAM: 2.07 CM
BH CV ECHO MEAS - LVPWD: 1.45 CM
BH CV ECHO MEAS - MED PEAK E' VEL: 6.9 CM/SEC
BH CV ECHO MEAS - MR MAX PG: 14.5 MMHG
BH CV ECHO MEAS - MR MAX VEL: 190.2 CM/SEC
BH CV ECHO MEAS - MV A DUR: 0.11 SEC
BH CV ECHO MEAS - MV A MAX VEL: 81.8 CM/SEC
BH CV ECHO MEAS - MV DEC SLOPE: 222.7 CM/SEC2
BH CV ECHO MEAS - MV DEC TIME: 0.2 MSEC
BH CV ECHO MEAS - MV E MAX VEL: 45.4 CM/SEC
BH CV ECHO MEAS - MV E/A: 0.55
BH CV ECHO MEAS - MV MAX PG: 3.3 MMHG
BH CV ECHO MEAS - MV MEAN PG: 1.05 MMHG
BH CV ECHO MEAS - MV V2 VTI: 22.4 CM
BH CV ECHO MEAS - MVA(VTI): 2.9 CM2
BH CV ECHO MEAS - PA ACC TIME: 0.13 SEC
BH CV ECHO MEAS - PA PR(ACCEL): 19.6 MMHG
BH CV ECHO MEAS - PA V2 MAX: 152 CM/SEC
BH CV ECHO MEAS - PI END-D VEL: 112.7 CM/SEC
BH CV ECHO MEAS - PULM A REVS DUR: 0.12 SEC
BH CV ECHO MEAS - PULM A REVS VEL: 25.7 CM/SEC
BH CV ECHO MEAS - PULM DIAS VEL: 40.3 CM/SEC
BH CV ECHO MEAS - PULM SYS VEL: 39.4 CM/SEC
BH CV ECHO MEAS - RAP SYSTOLE: 3 MMHG
BH CV ECHO MEAS - RV MAX PG: 2.02 MMHG
BH CV ECHO MEAS - RV V1 MAX: 71.1 CM/SEC
BH CV ECHO MEAS - RV V1 VTI: 13.9 CM
BH CV ECHO MEAS - RVOT DIAM: 2.06 CM
BH CV ECHO MEAS - RVSP: 32 MMHG
BH CV ECHO MEAS - SI(MOD-SP2): 27.8 ML/M2
BH CV ECHO MEAS - SI(MOD-SP4): 32.1 ML/M2
BH CV ECHO MEAS - SV(LVOT): 64.9 ML
BH CV ECHO MEAS - SV(MOD-SP2): 52 ML
BH CV ECHO MEAS - SV(MOD-SP4): 60 ML
BH CV ECHO MEAS - SV(RVOT): 46.1 ML
BH CV ECHO MEAS - TAPSE (>1.6): 1.95 CM
BH CV ECHO MEAS - TR MAX PG: 29.4 MMHG
BH CV ECHO MEAS - TR MAX VEL: 271 CM/SEC
BH CV ECHO MEASUREMENTS AVERAGE E/E' RATIO: 5.07
BH CV XLRA - RV BASE: 3.5 CM
BH CV XLRA - RV LENGTH: 6.3 CM
BH CV XLRA - RV MID: 2.24 CM
BH CV XLRA - TDI S': 9.6 CM/SEC
LEFT ATRIUM VOLUME INDEX: 23 ML/M2
LV EF 2D ECHO EST: 50 %
MAXIMAL PREDICTED HEART RATE: 151 BPM
SINUS: 3.4 CM
STJ: 3.1 CM
STRESS TARGET HR: 128 BPM

## 2022-04-13 PROCEDURE — 93306 TTE W/DOPPLER COMPLETE: CPT

## 2022-04-13 PROCEDURE — 25010000002 PERFLUTREN (DEFINITY) 8.476 MG IN SODIUM CHLORIDE (PF) 0.9 % 10 ML INJECTION: Performed by: INTERNAL MEDICINE

## 2022-04-13 PROCEDURE — 93306 TTE W/DOPPLER COMPLETE: CPT | Performed by: INTERNAL MEDICINE

## 2022-04-13 RX ADMIN — PERFLUTREN 1.5 ML: 6.52 INJECTION, SUSPENSION INTRAVENOUS at 12:51

## 2022-04-13 NOTE — PROGRESS NOTES
Ejection fraction went from 26 to 30% and is now 50%.  She is obviously a super responder to the CRT.    This is great news.  Kristin or Jess can you let her know the results of the echo.    Sherwin, thanks for the referral

## 2022-04-14 ENCOUNTER — TELEPHONE (OUTPATIENT)
Dept: CARDIOLOGY | Facility: CLINIC | Age: 72
End: 2022-04-14

## 2022-08-10 ENCOUNTER — OFFICE VISIT (OUTPATIENT)
Dept: CARDIOLOGY | Facility: CLINIC | Age: 72
End: 2022-08-10

## 2022-08-10 DIAGNOSIS — E78.2 MIXED HYPERLIPIDEMIA: ICD-10-CM

## 2022-08-10 DIAGNOSIS — Z95.810 PRESENCE OF BIVENTRICULAR IMPLANTABLE CARDIOVERTER-DEFIBRILLATOR (ICD): ICD-10-CM

## 2022-08-10 DIAGNOSIS — R00.1 SINUS BRADYCARDIA: ICD-10-CM

## 2022-08-10 DIAGNOSIS — I25.5 ISCHEMIC CARDIOMYOPATHY: ICD-10-CM

## 2022-08-10 DIAGNOSIS — I65.23 BILATERAL CAROTID ARTERY STENOSIS: ICD-10-CM

## 2022-08-10 DIAGNOSIS — I44.7 LEFT BUNDLE BRANCH BLOCK (LBBB): ICD-10-CM

## 2022-08-10 DIAGNOSIS — I10 ESSENTIAL HYPERTENSION: ICD-10-CM

## 2022-08-10 DIAGNOSIS — E11.9 TYPE 2 DIABETES MELLITUS WITHOUT COMPLICATION, WITHOUT LONG-TERM CURRENT USE OF INSULIN: ICD-10-CM

## 2022-08-10 DIAGNOSIS — I25.10 CORONARY ARTERY DISEASE INVOLVING NATIVE CORONARY ARTERY OF NATIVE HEART WITHOUT ANGINA PECTORIS: Primary | ICD-10-CM

## 2022-08-10 PROCEDURE — 93000 ELECTROCARDIOGRAM COMPLETE: CPT | Performed by: INTERNAL MEDICINE

## 2022-08-10 PROCEDURE — 99214 OFFICE O/P EST MOD 30 MIN: CPT | Performed by: INTERNAL MEDICINE

## 2022-08-10 NOTE — PROGRESS NOTES
Date of Office Visit: 08/10/2022  Encounter Provider: Sherwin Robertson MD  Place of Service: [facility] CARDIOLOGY  Patient Name: Petra Soto  :1950    Chief Complaint   Patient presents with   • Coronary Artery Disease   :     HPI: Petra Soto is a 71 y.o. female who presents today to follow up. I have reviewed prior notes and there are no changes except for any new updates described below. I have also reviewed any information entered into the medical record by the patient or by ancillary staff.     In , she suffered a myocardial infarction. Presumably this was due to occlusion of the left anterior descending artery. She underwent angioplasty and stenting but this failed and she then underwent coronary artery bypass grafting with a LIMA to the diagonal and a saphenous vein graft to the LAD. In , she underwent cardiac catheterization which revealed complete occlusion of the LAD as well as complete occlusion of the saphenous vein graft to the LAD. All other vessels were normal and her LIMA to the diagonal was normal as well. Her LVEF had been stable at 45% for years, and she had a LBBB.     She presented in 2021 with progressive dyspnea and angina. She was found to be in decompensated CHF and suffered a type 2 (demand) myocardial infarction. An echo revealed a decline in LVEF to 25% with a dyskinetic septum due to a left bundle. Coronary angiography revealed a patent LIMA-D1, but the SVG-LAD was occluded. The native LAD was occluded distally. There was 40-50% disease elsewhere.  Her metoprolol dose was increased at discharge, and she was diuresed. Unfortunately, she struggled with severe sinus bradycardia after this, even despite decreasing her BB. In 2021, she underwent CRT-D implantation ([employer]). By 2022, her LVEF had improved to 50%.     She feels great and has no cardiac complaints.     Past Medical History:   Diagnosis Date   • Carotid  atherosclerosis     nonosbstructive, minimal by US 3/2016 (<15% bilaterally)   • Coronary artery disease     MI 1999, s/p PCI-LAD vs diagonal (3*13mm Duet), f/b near immediate CABG (LIMA-diag, SVG-LAD).  Cath 2005 with normal LM/LCx/RCA,  LAD,  SVG-LAD, patent LIMA-diag, R-L collaterals   • Diabetes mellitus (HCC)    • Essential hypertension    • Hyperlipidemia    • Ischemic cardiomyopathy     mild, EF 45%   • Left bundle branch block    • Multiple URI    • Symptomatic sinus bradycardia        Past Surgical History:   Procedure Laterality Date   • BLADDER SURGERY  12/2017   • CARDIAC CATHETERIZATION N/A 11/29/2021    Procedure: Left Heart Cath;  Surgeon: Valerie Hoover MD;  Location: [facility] CATH INVASIVE LOCATION;  Service: Cardiovascular;  Laterality: N/A;   • CARDIAC CATHETERIZATION N/A 11/29/2021    Procedure: Left ventriculography;  Surgeon: Valerie Hoover MD;  Location: [facility] CATH INVASIVE LOCATION;  Service: Cardiovascular;  Laterality: N/A;   • CARDIAC CATHETERIZATION N/A 11/29/2021    Procedure: Coronary angiography;  Surgeon: Valerie Hoover MD;  Location: [facility] CATH INVASIVE LOCATION;  Service: Cardiovascular;  Laterality: N/A;   • CARDIAC CATHETERIZATION  11/29/2021    Procedure: Saphenous Vein Graft;  Surgeon: Valerie Hoover MD;  Location: [facility] CATH INVASIVE LOCATION;  Service: Cardiovascular;;   • CARDIAC CATHETERIZATION N/A 11/29/2021    Procedure: Native mammary injection;  Surgeon: Valerie Hoover MD;  Location: [facility] CATH INVASIVE LOCATION;  Service: Cardiovascular;  Laterality: N/A;   • CARDIAC ELECTROPHYSIOLOGY PROCEDURE N/A 12/20/2021    Procedure: Biventricular Device Insertion- BIV ICD- [employer];  Surgeon: Noel Peña MD;  Location: [facility] CATH INVASIVE LOCATION;  Service: Cardiovascular;  Laterality: N/A;   • CATARACT EXTRACTION, BILATERAL     • CORONARY ARTERY BYPASS GRAFT     • HYSTERECTOMY     • PERIPHERAL ARTERIAL STENT GRAFT     • SUBMANDIBULAR GLAND EXCISION    "      Social History     Socioeconomic History   • Marital status:    Tobacco Use   • Smoking status: Never Smoker   • Smokeless tobacco: Never Used   Vaping Use   • Vaping Use: Never used   Substance and Sexual Activity   • Alcohol use: No     Comment: caffeine use: 'not often\"   • Drug use: No   • Sexual activity: Defer       Family History   Problem Relation Age of Onset   • Colon cancer Mother    • Stomach cancer Mother    • Heart disease Father    • Heart failure Father    • Hypertension Sister    • Thyroid disease Sister    • Hypertension Brother        Review of Systems   Constitutional: Positive for malaise/fatigue.   HENT: Positive for hearing loss.    Cardiovascular: Positive for chest pain and dyspnea on exertion. Negative for irregular heartbeat and leg swelling.   Respiratory: Negative for shortness of breath.    Neurological: Negative for dizziness and light-headedness.   All other systems reviewed and are negative.      Allergies   Allergen Reactions   • Contrast Dye Shortness Of Breath   • Iodinated Diagnostic Agents Shortness Of Breath   • Morphine And Related Nausea And Vomiting   • Oxycodone Nausea And Vomiting         Current Outpatient Medications:   •  amLODIPine (NORVASC) 5 MG tablet, Take 1 tablet by mouth Daily., Disp: 90 tablet, Rfl: 1  •  aspirin 81 MG tablet, Take 1 tablet by mouth Daily., Disp: 30 tablet, Rfl: 0  •  atorvastatin (LIPITOR) 20 MG tablet, TAKE ONE TABLET BY MOUTH DAILY, Disp: , Rfl:   •  Calcium Citrate-Vitamin D (CALCIUM CITRATE + D3 PO), Take 1 tablet by mouth Daily., Disp: , Rfl:   •  cetirizine (zyrTEC) 10 MG tablet, Take 10 mg by mouth Daily., Disp: , Rfl:   •  glipiZIDE (GLUCOTROL) 10 MG tablet, Take 10 mg by mouth 2 (Two) Times a Day., Disp: , Rfl:   •  glucose blood test strip, Test Blood Sugar two to three times a day as directed , Diagnosis E11.9, Accu-Check Smartview Test Strips, Disp: 300 each, Rfl: 1  •  isosorbide mononitrate (IMDUR) 60 MG 24 hr tablet, " TAKE ONE TABLET BY MOUTH DAILY, Disp: 90 tablet, Rfl: 0  •  Januvia 50 MG tablet, Take 100 mg by mouth Daily., Disp: , Rfl:   •  lisinopril (PRINIVIL,ZESTRIL) 40 MG tablet, TAKE ONE TABLET BY MOUTH DAILY, Disp: 90 tablet, Rfl: 0  •  metFORMIN (GLUCOPHAGE) 1000 MG tablet, Take 1 tablet by mouth 2 (Two) Times a Day With Meals., Disp: 60 tablet, Rfl: 0  •  metoprolol succinate XL (TOPROL-XL) 100 MG 24 hr tablet, Take 1 tablet by mouth Daily., Disp: 30 tablet, Rfl: 11  •  multivitamin (THERAGRAN) tablet tablet, Take 1 tablet by mouth daily., Disp: 30 tablet, Rfl: 0  •  nitroglycerin (NITROSTAT) 0.4 MG SL tablet, PLACE 1 TABLET UNDER THE TONGUE EVERY 5 MINUTES AS NEEDED FOR CHEST PAIN.NO MORE 3 DOSES IN 15 MINS., Disp: 30 tablet, Rfl: 2  •  polyethyl glycol-propyl glycol (SYSTANE) 0.4-0.3 % solution ophthalmic solution (artificial tears), Administer 1 drop to both eyes Daily., Disp: , Rfl:   •  trimethoprim (TRIMPEX) 100 MG tablet, Take 100 mg by mouth Daily., Disp: , Rfl:   •  estradiol (ESTRACE) 0.1 MG/GM vaginal cream, Insert  into the vagina 3 (Three) Times a Week., Disp: , Rfl:      Objective:     There were no vitals filed for this visit.  There is no height or weight on file to calculate BMI.    Physical Exam  Constitutional:       General: She is not in acute distress.  HENT:      Head: Normocephalic.      Nose: Nose normal.      Mouth/Throat:      Comments: Masked  Eyes:      Conjunctiva/sclera: Conjunctivae normal.   Cardiovascular:      Rate and Rhythm: Regular rhythm. Bradycardia present.      Pulses: Normal pulses.      Heart sounds: Normal heart sounds.   Pulmonary:      Effort: Pulmonary effort is normal.      Breath sounds: Normal breath sounds.   Abdominal:      Palpations: Abdomen is soft.      Tenderness: There is no abdominal tenderness.   Musculoskeletal:         General: No swelling.   Skin:     General: Skin is warm and dry.   Neurological:      General: No focal deficit present.      Mental  Status: She is alert and oriented to person, place, and time.   Psychiatric:         Mood and Affect: Mood normal.         Behavior: Behavior normal.         Thought Content: Thought content normal.         ECG 12 Lead    Date/Time: 8/10/2022 11:45 AM  Performed by: Sherwin Robertson MD  Authorized by: Sherwin Robertson MD   Comparison: compared with previous ECG   Similar to previous ECG  Rhythm: paced  Rhythm comments: A-V paced  QRS axis: left  Other: no other findings    Clinical impression: abnormal EKG            Assessment:       Diagnosis Plan   1. Coronary artery disease involving native coronary artery of native heart without angina pectoris     2. Mixed hyperlipidemia     3. Ischemic cardiomyopathy     4. Left bundle branch block (LBBB)     5. Sinus bradycardia     6. Presence of biventricular implantable cardioverter-defibrillator (ICD)     7. Essential hypertension     8. Bilateral carotid artery stenosis     9. Type 2 diabetes mellitus without complication, without long-term current use of insulin ([facility])       Plan:       1.  Coronary Artery Disease  Assessment  • The patient has CCS class I - angina only during strenuous or prolonged physical activity  • The patient has stable angina   • Continue atorvastatin.    Subjective - Objective  • There is a history of previous coronary artery bypass graft 1999  • Current antiplatelet therapy includes aspirin 81 mg      She had stable CAD by cath in 2021.    2-6.  For years, her ejection fraction was approximately 40%, and her heart rate was approximately 50 bpm.  Then she developed progressive left ventricular systolic dysfunction associated with acute congestive heart failure as well as sick sinus syndrome. She underwent CRT-D in December 2021 and her EF improved to 50%.     7. She will remain on metoprolol, amlodipine, ISMN, and lisinopril.     8. She had <15% disease in 2018; this can be rechecked in 2023.    Sincerely,       Sherwin Robertson MD

## 2022-08-15 RX ORDER — NITROGLYCERIN 0.4 MG/1
0.4 TABLET SUBLINGUAL
Qty: 30 TABLET | Refills: 2 | Status: SHIPPED | OUTPATIENT
Start: 2022-08-15

## 2022-10-05 ENCOUNTER — CLINICAL SUPPORT NO REQUIREMENTS (OUTPATIENT)
Dept: CARDIOLOGY | Facility: CLINIC | Age: 72
End: 2022-10-05

## 2022-10-05 ENCOUNTER — OFFICE VISIT (OUTPATIENT)
Dept: CARDIOLOGY | Facility: CLINIC | Age: 72
End: 2022-10-05

## 2022-10-05 VITALS
DIASTOLIC BLOOD PRESSURE: 92 MMHG | SYSTOLIC BLOOD PRESSURE: 146 MMHG | BODY MASS INDEX: 35.51 KG/M2 | HEIGHT: 62 IN | HEART RATE: 83 BPM | WEIGHT: 193 LBS

## 2022-10-05 DIAGNOSIS — E66.01 CLASS 2 SEVERE OBESITY DUE TO EXCESS CALORIES WITH SERIOUS COMORBIDITY AND BODY MASS INDEX (BMI) OF 35.0 TO 35.9 IN ADULT: ICD-10-CM

## 2022-10-05 DIAGNOSIS — I10 ESSENTIAL HYPERTENSION: ICD-10-CM

## 2022-10-05 DIAGNOSIS — I25.10 CORONARY ARTERY DISEASE INVOLVING NATIVE CORONARY ARTERY OF NATIVE HEART WITHOUT ANGINA PECTORIS: ICD-10-CM

## 2022-10-05 DIAGNOSIS — I25.5 ISCHEMIC CARDIOMYOPATHY: ICD-10-CM

## 2022-10-05 DIAGNOSIS — I25.5 ISCHEMIC CARDIOMYOPATHY: Primary | ICD-10-CM

## 2022-10-05 DIAGNOSIS — Z95.810 PRESENCE OF BIVENTRICULAR IMPLANTABLE CARDIOVERTER-DEFIBRILLATOR (ICD): ICD-10-CM

## 2022-10-05 DIAGNOSIS — I44.7 LEFT BUNDLE BRANCH BLOCK (LBBB): ICD-10-CM

## 2022-10-05 PROBLEM — E66.812 CLASS 2 SEVERE OBESITY DUE TO EXCESS CALORIES WITH SERIOUS COMORBIDITY AND BODY MASS INDEX (BMI) OF 35.0 TO 35.9 IN ADULT: Status: ACTIVE | Noted: 2022-10-05

## 2022-10-05 PROCEDURE — 99213 OFFICE O/P EST LOW 20 MIN: CPT | Performed by: NURSE PRACTITIONER

## 2022-10-05 PROCEDURE — 93284 PRGRMG EVAL IMPLANTABLE DFB: CPT | Performed by: INTERNAL MEDICINE

## 2022-10-05 PROCEDURE — 93000 ELECTROCARDIOGRAM COMPLETE: CPT | Performed by: NURSE PRACTITIONER

## 2022-10-05 PROCEDURE — 93290 INTERROG DEV EVAL ICPMS IP: CPT | Performed by: INTERNAL MEDICINE

## 2022-10-05 RX ORDER — SITAGLIPTIN 100 MG/1
1 TABLET, FILM COATED ORAL DAILY
COMMUNITY
Start: 2022-09-18

## 2022-10-05 NOTE — PROGRESS NOTES
Date of Office Visit: 10/05/2022  Encounter Provider: GALO Condon  Place of Service: [facility]  Patient Name: Petra Soto  :1950    Chief Complaint   Patient presents with   • Cardiomyopathy     6 month f/u   • LBBB   • Pacemaker Check   :     HPI: Petra Soto is a 72 y.o. female who follows with Dr. Robertson and Dr. Peña for ICM (EF 26-30% 2021), CAD, s/p CABG, bradycardia, HFrEF and LBBB.     Referred to Dr. Peña for CRT--felt to be a good candidate, s/p CRT-D 2021.     Presents for follow up.    Doing well, this device really helped her. No chest pain, dyspnea, PND, orthopnea, dizziness or edema.     Device interrogation showed normal function. A paced 99%, V paced 97% (primarily LV paced 98%). She had 2 --NST episodes, 5 & 6 beats in duration, asymptomatic.     She was noted to have T wave oversensing during testing of the LV lead---this was the only time it occurred, talked with Stanley[facility], adjusted sensitivity to 0.45mV and turned auto testing of LV lead off, will continue to monitor. All other testing was wnl.     She does not exercise but says she is very active around the house.     She has gained a few pounds---recommended some regular walking/exercise and watch dietary intake.       Past Medical History:   Diagnosis Date   • Bilateral carotid artery stenosis    • Carotid atherosclerosis     nonosbstructive, minimal by US 3/2016 (<15% bilaterally)   • Coronary artery disease     MI , s/p PCI-LAD vs diagonal (3*13mm Duet), f/b near immediate CABG (LIMA-diag, SVG-LAD).  Cath  with normal LM/LCx/RCA,  LAD,  SVG-LAD, patent LIMA-diag, R-L collaterals   • Diabetes mellitus (HCC)    • Essential hypertension    • Hyperlipidemia    • Ischemic cardiomyopathy     mild, EF 45%   • Left bundle branch block    • Multiple URI    • Presence of biventricular implantable cardioverter-defibrillator (ICD)    • Symptomatic sinus  "bradycardia        Past Surgical History:   Procedure Laterality Date   • BLADDER SURGERY  12/2017   • CARDIAC CATHETERIZATION N/A 11/29/2021    Procedure: Left Heart Cath;  Surgeon: Valerie Hoover MD;  Location:  [facility] CATH INVASIVE LOCATION;  Service: Cardiovascular;  Laterality: N/A;   • CARDIAC CATHETERIZATION N/A 11/29/2021    Procedure: Left ventriculography;  Surgeon: Valerie Hoover MD;  Location:  [facility] CATH INVASIVE LOCATION;  Service: Cardiovascular;  Laterality: N/A;   • CARDIAC CATHETERIZATION N/A 11/29/2021    Procedure: Coronary angiography;  Surgeon: Valerie Hoover MD;  Location:  [facility] CATH INVASIVE LOCATION;  Service: Cardiovascular;  Laterality: N/A;   • CARDIAC CATHETERIZATION  11/29/2021    Procedure: Saphenous Vein Graft;  Surgeon: Valerie Hoover MD;  Location:  [facility] CATH INVASIVE LOCATION;  Service: Cardiovascular;;   • CARDIAC CATHETERIZATION N/A 11/29/2021    Procedure: Native mammary injection;  Surgeon: Valerie Hoover MD;  Location:  [facility] CATH INVASIVE LOCATION;  Service: Cardiovascular;  Laterality: N/A;   • CARDIAC ELECTROPHYSIOLOGY PROCEDURE N/A 12/20/2021    Procedure: Biventricular Device Insertion- BIV ICD- [employer];  Surgeon: Noel Peña MD;  Location:  [facility] CATH INVASIVE LOCATION;  Service: Cardiovascular;  Laterality: N/A;   • CATARACT EXTRACTION, BILATERAL     • CORONARY ARTERY BYPASS GRAFT     • HYSTERECTOMY     • PERIPHERAL ARTERIAL STENT GRAFT     • SUBMANDIBULAR GLAND EXCISION         Social History     Socioeconomic History   • Marital status:    Tobacco Use   • Smoking status: Never Smoker   • Smokeless tobacco: Never Used   Vaping Use   • Vaping Use: Never used   Substance and Sexual Activity   • Alcohol use: No     Comment: caffeine use: 'not often\"   • Drug use: No   • Sexual activity: Defer       Family History   Problem Relation Age of Onset   • Colon cancer Mother    • Stomach cancer Mother    • Heart disease Father    • Heart failure Father  "   • Hypertension Sister    • Thyroid disease Sister    • Hypertension Brother        Review of Systems   Constitutional: Negative for chills, fever and malaise/fatigue.   Cardiovascular: Negative for chest pain, dyspnea on exertion, leg swelling, near-syncope, orthopnea, palpitations, paroxysmal nocturnal dyspnea and syncope.   Respiratory: Negative for cough and shortness of breath.    Musculoskeletal: Negative for joint pain, joint swelling and myalgias.   Gastrointestinal: Negative for abdominal pain, diarrhea, melena, nausea and vomiting.   Genitourinary: Negative for frequency and hematuria.   Neurological: Negative for light-headedness, numbness, paresthesias and seizures.   Allergic/Immunologic: Negative.    All other systems reviewed and are negative.      Allergies   Allergen Reactions   • Contrast Dye Shortness Of Breath   • Iodinated Diagnostic Agents Shortness Of Breath   • Morphine And Related Nausea And Vomiting   • Oxycodone Nausea And Vomiting         Current Outpatient Medications:   •  amLODIPine (NORVASC) 5 MG tablet, Take 1 tablet by mouth Daily., Disp: 90 tablet, Rfl: 1  •  aspirin 81 MG tablet, Take 1 tablet by mouth Daily., Disp: 30 tablet, Rfl: 0  •  atorvastatin (LIPITOR) 20 MG tablet, TAKE ONE TABLET BY MOUTH DAILY, Disp: , Rfl:   •  Calcium Citrate-Vitamin D (CALCIUM CITRATE + D3 PO), Take 1 tablet by mouth Daily., Disp: , Rfl:   •  cetirizine (zyrTEC) 10 MG tablet, Take 10 mg by mouth Daily., Disp: , Rfl:   •  estradiol (ESTRACE) 0.1 MG/GM vaginal cream, Insert  into the vagina 3 (Three) Times a Week., Disp: , Rfl:   •  glipiZIDE (GLUCOTROL) 10 MG tablet, Take 10 mg by mouth 2 (Two) Times a Day., Disp: , Rfl:   •  glucose blood test strip, Test Blood Sugar two to three times a day as directed , Diagnosis E11.9, Accu-Check Smartview Test Strips, Disp: 300 each, Rfl: 1  •  isosorbide mononitrate (IMDUR) 60 MG 24 hr tablet, TAKE ONE TABLET BY MOUTH DAILY, Disp: 90 tablet, Rfl: 0  •  Januvia  "100 MG tablet, Take 1 tablet by mouth Daily., Disp: , Rfl:   •  lisinopril (PRINIVIL,ZESTRIL) 40 MG tablet, TAKE ONE TABLET BY MOUTH DAILY, Disp: 90 tablet, Rfl: 0  •  metFORMIN (GLUCOPHAGE) 1000 MG tablet, Take 1 tablet by mouth 2 (Two) Times a Day With Meals., Disp: 60 tablet, Rfl: 0  •  metoprolol succinate XL (TOPROL-XL) 100 MG 24 hr tablet, Take 1 tablet by mouth Daily., Disp: 30 tablet, Rfl: 11  •  multivitamin (THERAGRAN) tablet tablet, Take 1 tablet by mouth daily., Disp: 30 tablet, Rfl: 0  •  nitroglycerin (NITROSTAT) 0.4 MG SL tablet, Place 1 tablet under the tongue Every 5 (Five) Minutes As Needed for Chest Pain. Take no more than 3 doses in 15 minutes., Disp: 30 tablet, Rfl: 2  •  polyethyl glycol-propyl glycol (SYSTANE) 0.4-0.3 % solution ophthalmic solution (artificial tears), Administer 1 drop to both eyes Daily., Disp: , Rfl:       Objective:     Vitals:    10/05/22 1031   BP: 146/92   Pulse: 83   Weight: 87.5 kg (193 lb)   Height: 157.5 cm (62\")     Body mass index is 35.3 kg/m².    PHYSICAL EXAM:    Vitals Reviewed.   General Appearance: No acute distress, obese.    Eyes: Conjunctiva and lids: No erythema, swelling, or discharge. Sclera non-icteric.   HENT: Atraumatic, normocephalic. External eyes, ears, and nose normal.   Respiratory: No signs of respiratory distress. Respiration rhythm and depth normal.   Clear to auscultation. No rales, crackles, rhonchi, or wheezing auscultated.   Cardiovascular:  Heart Rate and Rhythm: Normal, Heart Sounds: S1 and S2.   Murmurs: No murmurs noted. No rubs, thrills, or gallops.   Lower Extremities: No edema noted.  Gastrointestinal:  Abdomen obese, non-tender.   Musculoskeletal: Normal movement of extremities  Skin: Warm and dry.   Psychiatric: Patient alert and oriented to person, place, and time. Speech and behavior appropriate. Normal mood and affect.       ECG 12 Lead    Date/Time: 10/5/2022 11:20 AM  Performed by: Concepción Pack APRN  Authorized by: Sirena, " GALO Bridges   Comparison: compared with previous ECG   Similar to previous ECG  Rhythm: paced  BPM: 83  Pacing: dual chamber pacing and 100% capture            Assessment:       Diagnosis Plan   1. Ischemic cardiomyopathy     2. Left bundle branch block (LBBB)     3. Presence of biventricular implantable cardioverter-defibrillator (ICD)     4. Coronary artery disease involving native coronary artery of native heart without angina pectoris     5. Essential hypertension     6. Class 2 severe obesity due to excess calories with serious comorbidity and body mass index (BMI) of 35.0 to 35.9 in adult (HCC)            Plan:       1.-3. ICM, LBBB, s/p CRT-D. Doing well. Euvolemic by exam. Normal function.     4. CAD, s/p CABG, no angina. Follows with Dr. Robertson.     5. HTN, mildly elevated today, she monitors at home as says it has been normal.     6. For the problem of overweight/obesity, we discussed the importance of lifestyle measures and strategies for weight loss, such as improved nutrition, regular exercise and sleep hygiene.      Follow up with Dr. Peña in 6 months w/device check, follow up with GALO Donovan in August as scheduled.     As always, it has been a pleasure to participate in your patient's care.      Sincerely,         GALO Bay

## 2022-12-08 RX ORDER — METOPROLOL SUCCINATE 100 MG/1
TABLET, EXTENDED RELEASE ORAL
Qty: 30 TABLET | Refills: 9 | Status: SHIPPED | OUTPATIENT
Start: 2022-12-08

## 2022-12-29 PROCEDURE — 93295 DEV INTERROG REMOTE 1/2/MLT: CPT | Performed by: INTERNAL MEDICINE

## 2022-12-29 PROCEDURE — 93296 REM INTERROG EVL PM/IDS: CPT | Performed by: INTERNAL MEDICINE

## 2023-05-10 ENCOUNTER — CLINICAL SUPPORT NO REQUIREMENTS (OUTPATIENT)
Dept: CARDIOLOGY | Facility: CLINIC | Age: 73
End: 2023-05-10
Payer: MEDICARE

## 2023-05-10 ENCOUNTER — OFFICE VISIT (OUTPATIENT)
Dept: CARDIOLOGY | Facility: CLINIC | Age: 73
End: 2023-05-10
Payer: MEDICARE

## 2023-05-10 VITALS
HEIGHT: 62 IN | DIASTOLIC BLOOD PRESSURE: 86 MMHG | WEIGHT: 190 LBS | SYSTOLIC BLOOD PRESSURE: 124 MMHG | HEART RATE: 86 BPM | BODY MASS INDEX: 34.96 KG/M2

## 2023-05-10 DIAGNOSIS — I44.7 LEFT BUNDLE BRANCH BLOCK (LBBB): Primary | ICD-10-CM

## 2023-05-10 DIAGNOSIS — R00.1 SINUS BRADYCARDIA: ICD-10-CM

## 2023-05-10 DIAGNOSIS — I25.5 ISCHEMIC CARDIOMYOPATHY: Primary | ICD-10-CM

## 2023-05-10 DIAGNOSIS — I25.5 ISCHEMIC CARDIOMYOPATHY: ICD-10-CM

## 2023-05-10 DIAGNOSIS — Z95.810 PRESENCE OF BIVENTRICULAR IMPLANTABLE CARDIOVERTER-DEFIBRILLATOR (ICD): ICD-10-CM

## 2023-05-10 PROCEDURE — 93000 ELECTROCARDIOGRAM COMPLETE: CPT | Performed by: INTERNAL MEDICINE

## 2023-05-10 PROCEDURE — 99213 OFFICE O/P EST LOW 20 MIN: CPT | Performed by: INTERNAL MEDICINE

## 2023-05-10 PROCEDURE — 3079F DIAST BP 80-89 MM HG: CPT | Performed by: INTERNAL MEDICINE

## 2023-05-10 PROCEDURE — 3074F SYST BP LT 130 MM HG: CPT | Performed by: INTERNAL MEDICINE

## 2023-05-10 NOTE — PROGRESS NOTES
"Date of Office Visit: 05/10/2023  Encounter Provider: Noel Peña MD  Place of Service: [facility] CARDIOLOGY  Patient Name: Petra Soto  : 1950    Subjective:     Encounter Date:05/10/2023      Patient ID: Petra Soto is a 72 y.o. female who has a cc of  ICM and LBBB and SB and I put in CRT_D in 12-21.    Feels great. Sees [facility] for gen cardiology    The patient had a good year.   No anginal chest pain,   No sig bland,   No soa,   No fainting,  No orthostasis.   No edema.   Exercise tolerance: good     There have been no hospital admission since the last visit.     There have been no bleeding events.       Past Medical History:   Diagnosis Date   • Bilateral carotid artery stenosis    • Carotid atherosclerosis     nonosbstructive, minimal by US 3/2016 (<15% bilaterally)   • Coronary artery disease     MI , s/p PCI-LAD vs diagonal (3*13mm Duet), f/b near immediate CABG (LIMA-diag, SVG-LAD).  Cath  with normal LM/LCx/RCA,  LAD,  SVG-LAD, patent LIMA-diag, R-L collaterals   • Diabetes mellitus    • Essential hypertension    • Hyperlipidemia    • Ischemic cardiomyopathy     mild, EF 45%   • Left bundle branch block    • Multiple URI    • Presence of biventricular implantable cardioverter-defibrillator (ICD)    • Symptomatic sinus bradycardia        Social History     Socioeconomic History   • Marital status:    Tobacco Use   • Smoking status: Never   • Smokeless tobacco: Never   Vaping Use   • Vaping Use: Never used   Substance and Sexual Activity   • Alcohol use: No     Comment: caffeine use: 'not often\"   • Drug use: No   • Sexual activity: Defer       Family History   Problem Relation Age of Onset   • Colon cancer Mother    • Stomach cancer Mother    • Heart disease Father    • Heart failure Father    • Hypertension Sister    • Thyroid disease Sister    • Hypertension Brother        Review of Systems   Constitutional: Negative for fever and night sweats.   HENT: " "Negative for ear pain and stridor.    Eyes: Negative for discharge and visual halos.   Cardiovascular: Negative for cyanosis.   Respiratory: Negative for hemoptysis and sputum production.    Hematologic/Lymphatic: Negative for adenopathy.   Skin: Negative for nail changes and unusual hair distribution.   Musculoskeletal: Negative for gout and joint swelling.   Gastrointestinal: Negative for bowel incontinence and flatus.   Genitourinary: Negative for dysuria and flank pain.   Neurological: Negative for seizures and tremors.   Psychiatric/Behavioral: Negative for altered mental status. The patient is not nervous/anxious.             Objective:     Vitals:    05/10/23 0900   BP: 124/86   Pulse: 86   Weight: 86.2 kg (190 lb)   Height: 157.5 cm (62\")         Eyes:      General:         Right eye: No discharge.         Left eye: No discharge.   HENT:      Head: Normocephalic and atraumatic.   Neck:      Thyroid: No thyromegaly.      Vascular: No JVD.   Pulmonary:      Effort: Pulmonary effort is normal.      Breath sounds: Normal breath sounds. No rales.   Cardiovascular:      Normal rate. Regular rhythm.      No gallop.   Edema:     Peripheral edema absent.   Abdominal:      General: Bowel sounds are normal.      Palpations: Abdomen is soft.      Tenderness: There is no abdominal tenderness.   Musculoskeletal: Normal range of motion.         General: No deformity. Skin:     General: Skin is warm and dry.      Findings: No erythema.   Neurological:      Mental Status: Alert and oriented to person, place, and time.      Motor: Normal muscle tone.   Psychiatric:         Behavior: Behavior normal.         Thought Content: Thought content normal.           ECG 12 Lead    Date/Time: 5/10/2023 9:28 AM  Performed by: Noel Peña MD  Authorized by: Noel Peña MD   Comparison: compared with previous ECG   Similar to previous ECG  Rhythm: sinus rhythm and paced            Lab Review:       Assessment:          Diagnosis " Plan   1. Left bundle branch block (LBBB)        2. Sinus bradycardia        3. Presence of biventricular implantable cardioverter-defibrillator (ICD)        4. Ischemic cardiomyopathy               Plan:     CRT-D -- all is good.     I reviewed the pacemaker/ICD tracings and the pacing and sensing parameters are normal.  AF burden is 0     ICM an HFrEF -- per JK. On GDMT

## 2023-08-16 ENCOUNTER — OFFICE VISIT (OUTPATIENT)
Dept: CARDIOLOGY | Facility: CLINIC | Age: 73
End: 2023-08-16
Payer: MEDICARE

## 2023-08-16 VITALS
SYSTOLIC BLOOD PRESSURE: 122 MMHG | HEIGHT: 62 IN | HEART RATE: 67 BPM | BODY MASS INDEX: 34.71 KG/M2 | WEIGHT: 188.6 LBS | DIASTOLIC BLOOD PRESSURE: 84 MMHG

## 2023-08-16 DIAGNOSIS — E66.9 CLASS 1 OBESITY WITH SERIOUS COMORBIDITY AND BODY MASS INDEX (BMI) OF 34.0 TO 34.9 IN ADULT, UNSPECIFIED OBESITY TYPE: ICD-10-CM

## 2023-08-16 DIAGNOSIS — E78.2 MIXED HYPERLIPIDEMIA: ICD-10-CM

## 2023-08-16 DIAGNOSIS — E11.9 TYPE 2 DIABETES MELLITUS WITHOUT COMPLICATION, WITH LONG-TERM CURRENT USE OF INSULIN: ICD-10-CM

## 2023-08-16 DIAGNOSIS — I10 ESSENTIAL HYPERTENSION: ICD-10-CM

## 2023-08-16 DIAGNOSIS — I25.5 ISCHEMIC CARDIOMYOPATHY: ICD-10-CM

## 2023-08-16 DIAGNOSIS — I44.7 LEFT BUNDLE BRANCH BLOCK (LBBB): ICD-10-CM

## 2023-08-16 DIAGNOSIS — I25.10 CORONARY ARTERY DISEASE INVOLVING NATIVE CORONARY ARTERY OF NATIVE HEART WITHOUT ANGINA PECTORIS: Primary | ICD-10-CM

## 2023-08-16 DIAGNOSIS — Z95.810 PRESENCE OF BIVENTRICULAR IMPLANTABLE CARDIOVERTER-DEFIBRILLATOR (ICD): ICD-10-CM

## 2023-08-16 DIAGNOSIS — Z79.4 TYPE 2 DIABETES MELLITUS WITHOUT COMPLICATION, WITH LONG-TERM CURRENT USE OF INSULIN: ICD-10-CM

## 2023-08-16 DIAGNOSIS — I65.23 BILATERAL CAROTID ARTERY STENOSIS: ICD-10-CM

## 2023-08-16 NOTE — PROGRESS NOTES
Date of Office Visit: 2023  Encounter Provider: GALO Zamora  Place of Service: [facility] CARDIOLOGY  Patient Name: Petra Soto  :1950  Primary Cardiologist: Dr. Sherwin Robertson    Chief Complaint   Patient presents with    Coronary Artery Disease    Cardiomyopathy    Annual Exam   :     HPI: Petra Soto is a 72 y.o. female who presents today for annual cardiac follow-up visit.  I have reviewed her medical records.    She has known hypertension, hyperlipidemia, and type 2 diabetes mellitus.    In , she suffered a myocardial infarction presumably due to occlusion of the left anterior descending artery.  She went underwent angioplasty and stenting, but this failed.  She then underwent CABG (LIMA to diagonal and saphenous vein graft to the LAD).    In , she underwent cardiac catheterization which revealed complete occlusion of the LAD and complete occlusion of the saphenous vein graft to the LAD.  Medical treatment recommended.  EF remained stable at 45% for years and she had a known left bundle branch block.    In 2018, carotid duplex was normal.    In 2021, she presented with progressive dyspnea and angina.  She was found to be in decompensated heart failure and suffered a type II (demand) myocardial infarction.  EF decreased to 25% with septal dyskinesis due to left bundle branch block.  Coronary angiography revealed patent LIMA-D1 and SVG-LAD occluded and native LAD occluded distally.  She struggled with severe sinus bradycardia.  In 2021, she underwent CRT-D Medtr[employer] implantation.  In 2022, EF improved to 50% per echocardiogram.    In 2022, echocardiogram showed EF 50%, mild mitral valve regurgitation and mild tricuspid regurgitation.    She presents today for follow-up visit with her  accompanying her.  She was diagnosed with B12 deficiency and has received B12 injections.  She was feeling off balance and  Problem: Delirium, Risk for  Goal: # No symptoms of delirium  Evaluate delirium symptoms under active problem when present  Outcome: Outcome Met, Continue evaluating goal progress toward completion  Pt is elopement risk by policy, but risk minimal d/t pt's impaired mobility.  Bed/ chair alarm on, with frequent visual monitoring.       dizziness and the B12 injections have helped this.  She has recently started insulin which has been challenging for her.  She denies chest pain, shortness of air, palpitations, edema, syncope, or bleeding.  Her blood pressure and heart rate are both normal.  I reviewed her recent blood work from March and June 2023.      Past Medical History:   Diagnosis Date    Bilateral carotid artery stenosis     Carotid atherosclerosis     nonosbstructive, minimal by US 3/2016 (<15% bilaterally)    Coronary artery disease     MI 1999, s/p PCI-LAD vs diagonal (3*13mm Duet), f/b near immediate CABG (LIMA-diag, SVG-LAD).  Cath 2005 with normal LM/LCx/RCA,  LAD,  SVG-LAD, patent LIMA-diag, R-L collaterals    Diabetes mellitus     Essential hypertension     Hyperlipidemia     Ischemic cardiomyopathy     mild, EF 45%    Left bundle branch block     Multiple URI     Presence of biventricular implantable cardioverter-defibrillator (ICD)     Symptomatic sinus bradycardia        Past Surgical History:   Procedure Laterality Date    BLADDER SURGERY  12/2017    CARDIAC CATHETERIZATION N/A 11/29/2021    Procedure: Left Heart Cath;  Surgeon: Valerie Hoover MD;  Location: [facility] CATH INVASIVE LOCATION;  Service: Cardiovascular;  Laterality: N/A;    CARDIAC CATHETERIZATION N/A 11/29/2021    Procedure: Left ventriculography;  Surgeon: Valerie Hoover MD;  Location: [facility] CATH INVASIVE LOC[facility];  Service: Cardiovascular;  Laterality: N/A;    CARDIAC CATHETERIZATION N/A 11/29/2021    Procedure: Coronary angiography;  Surgeon: Valerie Hoover MD;  [facility] C[facility];  Service: Cardiovascular;  Laterality: N/A;    CARDIAC CATHETERIZATION  11/29/2021    Procedure: Saphenous Vein Graft;  Surgeon: Valerie Hoover MD;  Location:  [facility] CATH INVASIVE LOCATION;  Service: Cardiovascular;;    CARDIAC CATHETERIZATION N/A 11/29/2021    Procedure: Native mammary injection;  Surgeon: Valerie Hoover MD;  Location: [facility] CATH  "INVASIVE LOCATION;  Service: Cardiovascular;  Laterality: N/A;    CARDIAC ELECTROPHYSIOLOGY PROCEDURE N/A 12/20/2021    Procedure: Biventricular Device Insertion- BIV ICD- MEDTRONIC;  Surgeon: Noel Peña MD;  Location: [facility] INVASIVE LOCATION;  Service: Cardiovascular;  Laterality: N/A;    CATARACT EXTRACTION, BILATERAL      CORONARY ARTERY BYPASS GRAFT      HYSTERECTOMY      PERIPHERAL ARTERIAL STENT GRAFT      SUBMANDIBULAR GLAND EXCISION         Social History     Socioeconomic History    Marital status:    Tobacco Use    Smoking status: Never    Smokeless tobacco: Never   Vaping Use    Vaping Use: Never used   Substance and Sexual Activity    Alcohol use: No     Comment: caffeine use: 'not often\"    Drug use: No    Sexual activity: Defer       Family History   Problem Relation Age of Onset    Colon cancer Mother     Stomach cancer Mother     Heart disease Father     Heart failure Father     Hypertension Sister     Thyroid disease Sister     Hypertension Brother        The following portion of the patient's history were reviewed and updated as appropriate: past medical history, past surgical history, past social history, past family history, allergies, current medications, and problem list.    Review of Systems   Constitutional: Negative.   Cardiovascular: Negative.    Respiratory: Negative.     Hematologic/Lymphatic: Negative.    Neurological:  Positive for dizziness and loss of balance.     Allergies   Allergen Reactions    Contrast Dye (Echo Or Unknown Ct/Mr) Shortness Of Breath    Iodinated Contrast Media Shortness Of Breath    Morphine And Related Nausea And Vomiting    Oxycodone Nausea And Vomiting         Current Outpatient Medications:     amLODIPine (NORVASC) 5 MG tablet, Take 1 tablet by mouth Daily., Disp: 90 tablet, Rfl: 1    aspirin 81 MG tablet, Take 1 tablet by mouth Daily., Disp: 30 tablet, Rfl: 0    atorvastatin (LIPITOR) 20 MG tablet, TAKE ONE TABLET BY MOUTH DAILY, Disp: , " "Rfl:     Calcium Citrate-Vitamin D (CALCIUM CITRATE + D3 PO), Take 1 tablet by mouth Daily., Disp: , Rfl:     cetirizine (zyrTEC) 10 MG tablet, Take 1 tablet by mouth Daily., Disp: , Rfl:     estradiol (ESTRACE) 0.1 MG/GM vaginal cream, Insert  into the vagina 3 (Three) Times a Week., Disp: , Rfl:     glipiZIDE (GLUCOTROL) 10 MG tablet, Take 1 tablet by mouth 2 (Two) Times a Day., Disp: , Rfl:     glucose blood test strip, Test Blood Sugar two to three times a day as directed , Diagnosis E11.9, Accu-Check Smartview Test Strips, Disp: 300 each, Rfl: 1    Insulin Detemir (LEVEMIR FLEXTOUCH SC), Inject 6 Units under the skin into the appropriate area as directed Daily As Needed., Disp: , Rfl:     isosorbide mononitrate (IMDUR) 60 MG 24 hr tablet, TAKE ONE TABLET BY MOUTH DAILY, Disp: 90 tablet, Rfl: 0    lisinopril (PRINIVIL,ZESTRIL) 40 MG tablet, TAKE ONE TABLET BY MOUTH DAILY, Disp: 90 tablet, Rfl: 0    metFORMIN (GLUCOPHAGE) 1000 MG tablet, Take 1 tablet by mouth 2 (Two) Times a Day With Meals., Disp: 60 tablet, Rfl: 0    metoprolol succinate XL (TOPROL-XL) 100 MG 24 hr tablet, TAKE ONE TABLET BY MOUTH DAILY, Disp: 30 tablet, Rfl: 9    multivitamin (THERAGRAN) tablet tablet, Take 1 tablet by mouth daily., Disp: 30 tablet, Rfl: 0    nitroglycerin (NITROSTAT) 0.4 MG SL tablet, Place 1 tablet under the tongue Every 5 (Five) Minutes As Needed for Chest Pain. Take no more than 3 doses in 15 minutes., Disp: 30 tablet, Rfl: 2    polyethyl glycol-propyl glycol (SYSTANE) 0.4-0.3 % solution ophthalmic solution (artificial tears), Administer 1 drop to both eyes Daily., Disp: , Rfl:          Objective:     Vitals:    08/16/23 1111   BP: 122/84   BP Location: Left arm   Patient Position: Sitting   Cuff Size: Adult   Pulse: 67   Weight: 85.5 kg (188 lb 9.6 oz)   Height: 157.5 cm (62.01\")     Body mass index is 34.49 kg/mý.    PHYSICAL EXAM:    Vitals Reviewed.   General Appearance: No acute distress, well developed and well " nourished. Obese.   HENT: No hearing loss noted.    Respiratory: No signs of respiratory distress. Respiration rhythm and depth normal.  Clear to auscultation.   Cardiovascular:  Jugular Venous Pressure: Normal  Heart Rate and Rhythm: Normal, Heart Sounds: Normal S1 and S2. No S3 or S4 noted.  Murmurs: No murmurs noted. No rubs, thrills, or gallops.   Lower Extremities: No edema noted.  Musculoskeletal: Normal movement of extremities.  Skin: General appearance normal.    Psychiatric: Patient alert and oriented to person, place, and time. Speech and behavior appropriate. Normal mood and affect.       ECG 12 Lead    Date/Time: 8/16/2023 11:05 AM  Performed by: Arlette Ingram APRN  Authorized by: Arlette Ingram APRN   Comparison: compared with previous ECG from 5/10/2023  Similar to previous ECG  Rhythm: paced  Rate: normal  BPM: 67  Conduction: conduction normal  ST Segments: ST segments normal  T Waves: T waves normal  QRS axis: normal  Pacing: dual chamber pacing and 100% capture  Clinical impression: abnormal EKG          Assessment:       Diagnosis Plan   1. Coronary artery disease involving native coronary artery of native heart without angina pectoris        2. Ischemic cardiomyopathy        3. Left bundle branch block (LBBB)        4. Presence of biventricular implantable cardioverter-defibrillator (ICD)        5. Bilateral carotid artery stenosis  Duplex Carotid Ultrasound CAR      6. Essential hypertension        7. Mixed hyperlipidemia        8. Type 2 diabetes mellitus without complication, with long-term current use of insulin        9. Class 1 obesity with serious comorbidity and body mass index (BMI) of 34.0 to 34.9 in adult, unspecified obesity type               Plan:       1.  Coronary Artery Disease.  Status post CABG (LIMA to diagonal and saphenous vein graft to the LAD) in 1999.  In 2021, she was noted to have patent LIMA-DL1 and SVG LAD occluded and native LAD occluded on heart cath.   Medical treatment recommended.  She currently denies angina.  Continue aspirin, atorvastatin, isosorbide, and metoprolol.    2.  Ischemic cardiomyopathy.  EF was 40-45% for years.  After CRT-D, EF improved to 50%.  Euvolemic.  Continue metoprolol succinate and lisinopril.    3.  Left bundle branch block.    4.  Status post CRT-D followed by Dr. Noel Peña.  Continue routine checks.    5.  Dr. Robertson recommended a repeat carotid duplex this year.    6.  Hypertension.  Blood pressure normal on current medication regimen.    7.  Hyperlipidemia.  Last lipid panel looked great and she will continue with atorvastatin.    8.  Type 2 diabetes mellitus.  Recently started insulin.  She said her blood sugars could be better controlled.  She would benefit from Jardiance or Farxiga and I will defer to her PCP.    9.  Obesity. Body mass index is 34.49 kg/mý.     10.  I recommend a 1 year follow-up visit with Dr. Sherwin Robertson.  She said her PCP is taking care of her medication refills.    As always, it has been a pleasure to participate in your patient's care. Thank you.         Sincerely,         GALO Ngo  [facility] Cardiology      Dictated utilizing Dragon Dictation

## 2023-09-13 ENCOUNTER — HOSPITAL ENCOUNTER (OUTPATIENT)
Dept: CARDIOLOGY | Facility: HOSPITAL | Age: 73
Discharge: HOME | End: 2023-09-13
Admitting: NURSE PRACTITIONER
Payer: MEDICARE

## 2023-09-13 DIAGNOSIS — I65.23 BILATERAL CAROTID ARTERY STENOSIS: ICD-10-CM

## 2023-09-13 LAB
BH CV XLRA MEAS LEFT DIST CCA EDV: -14.1 CM/SEC
BH CV XLRA MEAS LEFT DIST CCA PSV: -47.8 CM/SEC
BH CV XLRA MEAS LEFT DIST ICA EDV: -14.8 CM/SEC
BH CV XLRA MEAS LEFT DIST ICA PSV: -40 CM/SEC
BH CV XLRA MEAS LEFT ICA/CCA RATIO: 0.66
BH CV XLRA MEAS LEFT MID CCA EDV: 17.6 CM/SEC
BH CV XLRA MEAS LEFT MID CCA PSV: 48.6 CM/SEC
BH CV XLRA MEAS LEFT MID ICA EDV: -11.5 CM/SEC
BH CV XLRA MEAS LEFT MID ICA PSV: -27.4 CM/SEC
BH CV XLRA MEAS LEFT PROX CCA EDV: -15.3 CM/SEC
BH CV XLRA MEAS LEFT PROX CCA PSV: -67.4 CM/SEC
BH CV XLRA MEAS LEFT PROX ECA PSV: -46.8 CM/SEC
BH CV XLRA MEAS LEFT PROX ICA EDV: -13.1 CM/SEC
BH CV XLRA MEAS LEFT PROX ICA PSV: -31.5 CM/SEC
BH CV XLRA MEAS LEFT PROX SCLA PSV: 71.3 CM/SEC
BH CV XLRA MEAS LEFT VERTEBRAL A PSV: -22.8 CM/SEC
BH CV XLRA MEAS RIGHT DIST CCA EDV: -14.5 CM/SEC
BH CV XLRA MEAS RIGHT DIST CCA PSV: -39.2 CM/SEC
BH CV XLRA MEAS RIGHT DIST ICA EDV: -12.9 CM/SEC
BH CV XLRA MEAS RIGHT DIST ICA PSV: -28.2 CM/SEC
BH CV XLRA MEAS RIGHT ICA/CCA RATIO: 0.94
BH CV XLRA MEAS RIGHT MID CCA EDV: 11.8 CM/SEC
BH CV XLRA MEAS RIGHT MID CCA PSV: 39.2 CM/SEC
BH CV XLRA MEAS RIGHT MID ICA EDV: -15.3 CM/SEC
BH CV XLRA MEAS RIGHT MID ICA PSV: -36.8 CM/SEC
BH CV XLRA MEAS RIGHT PROX CCA EDV: 16.8 CM/SEC
BH CV XLRA MEAS RIGHT PROX CCA PSV: 71.4 CM/SEC
BH CV XLRA MEAS RIGHT PROX ECA PSV: -66.2 CM/SEC
BH CV XLRA MEAS RIGHT PROX ICA EDV: -18.8 CM/SEC
BH CV XLRA MEAS RIGHT PROX ICA PSV: -36.5 CM/SEC
BH CV XLRA MEAS RIGHT PROX SCLA PSV: 56.8 CM/SEC
BH CV XLRA MEAS RIGHT VERTEBRAL A PSV: -35.7 CM/SEC

## 2023-09-13 PROCEDURE — 93880 EXTRACRANIAL BILAT STUDY: CPT

## 2023-11-03 ENCOUNTER — TELEPHONE (OUTPATIENT)
Dept: CARDIOLOGY | Facility: CLINIC | Age: 73
End: 2023-11-03

## 2023-11-03 NOTE — TELEPHONE ENCOUNTER
Caller: Petra Soto    Relationship to patient: Self    Best call back number: [phone] (home)      Type of visit: DEVICE CHECK AND FOLLOW UP    Requested date: ASAP     If rescheduling, when is the original appointment: 11.22.23     Additional notes:PT STATES SHE RECEIVED A VM TO RESCHEDULE APPT WITH [facility]ALCON DUE TO HER BEING OUT THAT DAY, AVAILABILITY IS PASS TIMEFRAME PLEASE ADVISE WHEN TO R/S PT AND CALL PT BACK TO GET SCHEDULED.

## 2023-11-08 ENCOUNTER — OFFICE VISIT (OUTPATIENT)
Age: 73
End: 2023-11-08
Payer: MEDICARE

## 2023-11-08 ENCOUNTER — CLINICAL SUPPORT NO REQUIREMENTS (OUTPATIENT)
Age: 73
End: 2023-11-08
Payer: MEDICARE

## 2023-11-08 VITALS
HEART RATE: 84 BPM | BODY MASS INDEX: 34.41 KG/M2 | WEIGHT: 187 LBS | HEIGHT: 62 IN | SYSTOLIC BLOOD PRESSURE: 126 MMHG | DIASTOLIC BLOOD PRESSURE: 80 MMHG

## 2023-11-08 DIAGNOSIS — I25.5 ISCHEMIC CARDIOMYOPATHY: Primary | ICD-10-CM

## 2023-11-08 DIAGNOSIS — I10 ESSENTIAL HYPERTENSION: ICD-10-CM

## 2023-11-08 DIAGNOSIS — I44.7 LEFT BUNDLE BRANCH BLOCK (LBBB): ICD-10-CM

## 2023-11-08 DIAGNOSIS — I25.10 CORONARY ARTERY DISEASE INVOLVING NATIVE CORONARY ARTERY OF NATIVE HEART WITHOUT ANGINA PECTORIS: ICD-10-CM

## 2023-11-08 DIAGNOSIS — Z95.810 PRESENCE OF BIVENTRICULAR IMPLANTABLE CARDIOVERTER-DEFIBRILLATOR (ICD): ICD-10-CM

## 2023-11-08 PROCEDURE — 1160F RVW MEDS BY RX/DR IN RCRD: CPT | Performed by: NURSE PRACTITIONER

## 2023-11-08 PROCEDURE — 1159F MED LIST DOCD IN RCRD: CPT | Performed by: NURSE PRACTITIONER

## 2023-11-08 PROCEDURE — 3074F SYST BP LT 130 MM HG: CPT | Performed by: NURSE PRACTITIONER

## 2023-11-08 PROCEDURE — 99213 OFFICE O/P EST LOW 20 MIN: CPT | Performed by: NURSE PRACTITIONER

## 2023-11-08 PROCEDURE — 93284 PRGRMG EVAL IMPLANTABLE DFB: CPT | Performed by: INTERNAL MEDICINE

## 2023-11-08 PROCEDURE — 93290 INTERROG DEV EVAL ICPMS IP: CPT | Performed by: INTERNAL MEDICINE

## 2023-11-08 PROCEDURE — 3079F DIAST BP 80-89 MM HG: CPT | Performed by: NURSE PRACTITIONER

## 2023-11-08 PROCEDURE — 93000 ELECTROCARDIOGRAM COMPLETE: CPT | Performed by: NURSE PRACTITIONER

## 2023-11-08 RX ORDER — CYANOCOBALAMIN 1000 UG/ML
1000 INJECTION, SOLUTION INTRAMUSCULAR; SUBCUTANEOUS
COMMUNITY
Start: 2023-08-31

## 2023-11-08 RX ORDER — INSULIN ASPART 100 [IU]/ML
10 INJECTION, SOLUTION INTRAVENOUS; SUBCUTANEOUS
COMMUNITY
Start: 2023-09-14

## 2023-11-08 NOTE — PROGRESS NOTES
Date of Office Visit: 2023  Encounter Provider: GALO Condon  Place of Service: [facility] CARDIOLOGY  Patient Name: Petra Soto  :1950    Chief Complaint   Patient presents with    Cardiomyopathy    sinus bradycardia    LBBB    Pacemaker Check   :     HPI: Petra Soto is a 73 y.o. female who follows with Dr. Robertson and Dr. Peña for ICM (EF 26-30% 2021), CAD, status post CABG, HFrEF and LBBB, s/p CRT-D 2021.    Follow up Echo 2022 showed normalization of her LV systolic function, EF 50%.    She presents for follow-up and device check.    She reports that she is doing well.  She has no complaints of chest pain, dyspnea, PND, orthopnea or edema.    Device interrogation shows normal testing and function.  She is atrially paced 100%, CRT paced 98% with no arrhythmia events.    Past Medical History:   Diagnosis Date    Bilateral carotid artery stenosis     Carotid atherosclerosis     nonosbstructive, minimal by US 3/2016 (<15% bilaterally)    Coronary artery disease     MI , s/p PCI-LAD vs diagonal (3*13mm Duet), f/b near immediate CABG (LIMA-diag, SVG-LAD).  Cath  with normal LM/LCx/RCA,  LAD,  SVG-LAD, patent LIMA-diag, R-L collaterals    Diabetes mellitus     Essential hypertension     Hyperlipidemia     Ischemic cardiomyopathy     mild, EF 45%    Left bundle branch block     Multiple URI     Presence of biventricular implantable cardioverter-defibrillator (ICD)     Symptomatic sinus bradycardia        Past Surgical History:   Procedure Laterality Date    BLADDER SURGERY  2017    CARDIAC CATHETERIZATION N/A 2021    Procedure: Left Heart Cath;  Surgeon: Valerie Hoover MD;  Location:  [facility];  Service: Cardiovascular;  Laterality: N/A;    CARDIAC CATHETERIZATION N/A 2021    Procedure: Left ventriculography;  Surgeon: Valerie Hoover MD;  Location:  [facility];  Service: Cardiovascular;   "Laterality: N/A;    CARDIAC CATHETERIZATION N/A 11/29/2021    Procedure: Coronary angiography;  Surgeon: Valerie Hoover MD;  Location:  [facility];  Service: Cardiovascular;  Laterality: N/A;    CARDIAC CATHETERIZATION  11/29/2021    Procedure: Saphenous Vein Graft;  Surgeon: Valerie Hoover MD;  Location:  [facility];  Service: Cardiovascular;;    CARDIAC CATHETERIZATION N/A 11/29/2021    Procedure: Native mammary injection;  Surgeon: Valerie Hoover MD;  Location:  [facility];  Service: Cardiovascular;  Laterality: N/A;    CARDIAC ELECTROPHYSIOLOGY PROCEDURE N/A 12/20/2021    Procedure: Biventricular Device Insertion- BIV ICD- [employer];  Surgeon: Noel Peña MD;  Location:  [facility];  Service: Cardiovascular;  Laterality: N/A;    CATARACT EXTRACTION, BILATERAL      CORONARY ARTERY BYPASS GRAFT      HYSTERECTOMY      PERIPHERAL ARTERIAL STENT GRAFT      SUBMANDIBULAR GLAND EXCISION         Social History     Socioeconomic History    Marital status:    Tobacco Use    Smoking status: Never    Smokeless tobacco: Never   Vaping Use    Vaping Use: Never used   Substance and Sexual Activity    Alcohol use: No     Comment: caffeine use: 'not often\"    Drug use: No    Sexual activity: Defer       Family History   Problem Relation Age of Onset    Colon cancer Mother     Stomach cancer Mother     Heart disease Father     Heart failure Father     Hypertension Sister     Thyroid disease Sister     Hypertension Brother        Review of Systems   Constitutional: Negative for chills, fever and malaise/fatigue.   Cardiovascular:  Negative for chest pain, dyspnea on exertion, leg swelling, near-syncope, orthopnea, palpitations, paroxysmal nocturnal dyspnea and syncope.   Respiratory:  Negative for cough and shortness of breath.    Musculoskeletal:  Negative for joint pain, joint swelling and myalgias.   Gastrointestinal:  Negative for " abdominal pain, diarrhea, melena, nausea and vomiting.   Genitourinary:  Negative for frequency and hematuria.   Neurological:  Negative for light-headedness, numbness, paresthesias and seizures.   Allergic/Immunologic: Negative.    All other systems reviewed and are negative.      Allergies   Allergen Reactions    Contrast Dye (Echo Or Unknown Ct/Mr) Shortness Of Breath    Iodinated Contrast Media Shortness Of Breath    Morphine And Related Nausea And Vomiting    Oxycodone Nausea And Vomiting         Current Outpatient Medications:     amLODIPine (NORVASC) 5 MG tablet, Take 1 tablet by mouth Daily., Disp: 90 tablet, Rfl: 1    aspirin 81 MG tablet, Take 1 tablet by mouth Daily., Disp: 30 tablet, Rfl: 0    atorvastatin (LIPITOR) 20 MG tablet, TAKE ONE TABLET BY MOUTH DAILY, Disp: , Rfl:     Calcium Citrate-Vitamin D (CALCIUM CITRATE + D3 PO), Take 1 tablet by mouth Daily., Disp: , Rfl:     cetirizine (zyrTEC) 10 MG tablet, Take 1 tablet by mouth Daily., Disp: , Rfl:     cyanocobalamin 1000 MCG/ML injection, Inject 1 mL into the appropriate muscle as directed by prescriber Every 30 (Thirty) Days., Disp: , Rfl:     estradiol (ESTRACE) 0.1 MG/GM vaginal cream, Insert  into the vagina 3 (Three) Times a Week., Disp: , Rfl:     glipiZIDE (GLUCOTROL) 10 MG tablet, Take 1 tablet by mouth 2 (Two) Times a Day., Disp: , Rfl:     glucose blood test strip, Test Blood Sugar two to three times a day as directed , Diagnosis E11.9, Accu-Check Smartview Test Strips, Disp: 300 each, Rfl: 1    Insulin Aspart FlexPen 100 UNIT/ML solution pen-injector, Inject 10 Units under the skin into the appropriate area as directed., Disp: , Rfl:     Insulin Detemir (LEVEMIR FLEXTOUCH SC), Inject 6 Units under the skin into the appropriate area as directed Daily As Needed., Disp: , Rfl:     isosorbide mononitrate (IMDUR) 60 MG 24 hr tablet, TAKE ONE TABLET BY MOUTH DAILY, Disp: 90 tablet, Rfl: 0    lisinopril (PRINIVIL,ZESTRIL) 40 MG tablet, TAKE ONE  "TABLET BY MOUTH DAILY, Disp: 90 tablet, Rfl: 0    metFORMIN (GLUCOPHAGE) 1000 MG tablet, Take 1 tablet by mouth 2 (Two) Times a Day With Meals., Disp: 60 tablet, Rfl: 0    metoprolol succinate XL (TOPROL-XL) 100 MG 24 hr tablet, TAKE ONE TABLET BY MOUTH DAILY, Disp: 30 tablet, Rfl: 9    multivitamin (THERAGRAN) tablet tablet, Take 1 tablet by mouth daily., Disp: 30 tablet, Rfl: 0    nitroglycerin (NITROSTAT) 0.4 MG SL tablet, Place 1 tablet under the tongue Every 5 (Five) Minutes As Needed for Chest Pain. Take no more than 3 doses in 15 minutes., Disp: 30 tablet, Rfl: 2    polyethyl glycol-propyl glycol (SYSTANE) 0.4-0.3 % solution ophthalmic solution (artificial tears), Administer 1 drop to both eyes Daily., Disp: , Rfl:       Objective:     Vitals:    11/08/23 1004   BP: 126/80   Pulse: 84   Weight: 84.8 kg (187 lb)   Height: 157.5 cm (62.01\")     Body mass index is 34.19 kg/m².    PHYSICAL EXAM:    Vitals Reviewed.   General Appearance: No acute distress, well developed and well nourished.   Eyes: Conjunctiva and lids: No erythema, swelling, or discharge. Sclera non-icteric.   HENT: Atraumatic, normocephalic. External eyes, ears, and nose normal.   Respiratory: No signs of respiratory distress. Respiration rhythm and depth normal.   Clear to auscultation. No rales, crackles, rhonchi, or wheezing auscultated.   Cardiovascular:  Heart Rate and Rhythm: Normal, Heart Sounds: Normal S1 and S2.   Murmurs: No murmurs noted. No rubs, thrills, or gallops.   Lower Extremities: No edema noted.  Gastrointestinal:  Abdomen soft, non-distended, non-tender.   Musculoskeletal: Normal movement of extremities  Skin: Warm and dry.   Psychiatric: Patient alert and oriented to person, place, and time. Speech and behavior appropriate. Normal mood and affect.       ECG 12 Lead    Date/Time: 11/8/2023 10:25 AM  Performed by: Concepción Pack APRN    Authorized by: Concepción Pack APRN  Comparison: compared with previous ECG   Similar to " previous ECG  Rhythm: paced  BPM: 84  Pacing: dual chamber pacing          Assessment:       Diagnosis Plan   1. Ischemic cardiomyopathy        2. Left bundle branch block (LBBB)        3. Presence of biventricular implantable cardioverter-defibrillator (ICD)        4. Coronary artery disease involving native coronary artery of native heart without angina pectoris        5. Essential hypertension               Plan:       1.-3.  ICM, LBBB, status post CRT-D.  She is doing well.  Euvolemic by exam.  Echo in 2022 showed normalization of her EF at 50%.  Device showed normal testing and function with no arrhythmia events.    4.  CAD, status post CABG.  No angina, follows with Dr. Robertson for general cardiology.    5. HTN, controlled.     Follow-up with Dr. Peña in 6 months with device check, follow-up with Dr. Robertson in August as scheduled.    As always, it has been a pleasure to participate in your patient's care.      Sincerely,         GALO Bay

## 2023-11-13 RX ORDER — METOPROLOL SUCCINATE 100 MG/1
100 TABLET, EXTENDED RELEASE ORAL DAILY
Qty: 90 TABLET | Refills: 1 | Status: SHIPPED | OUTPATIENT
Start: 2023-11-13

## 2024-03-28 NOTE — TELEPHONE ENCOUNTER
----- Message from Noel Peña MD sent at 4/13/2022  5:55 PM EDT -----  Ejection fraction went from 26 to 30% and is now 50%.  She is obviously a super responder to the CRT.    This is great news.  Kristin or Jess can you let her know the results of the echo.    Sherwin, darren for the referral  
Patient has been informed.  
,

## 2024-05-02 RX ORDER — METOPROLOL SUCCINATE 100 MG/1
100 TABLET, EXTENDED RELEASE ORAL DAILY
Qty: 90 TABLET | Refills: 1 | Status: SHIPPED | OUTPATIENT
Start: 2024-05-02

## 2024-05-15 ENCOUNTER — OFFICE VISIT (OUTPATIENT)
Age: 74
End: 2024-05-15
Payer: MEDICARE

## 2024-05-15 ENCOUNTER — CLINICAL SUPPORT NO REQUIREMENTS (OUTPATIENT)
Age: 74
End: 2024-05-15
Payer: MEDICARE

## 2024-05-15 VITALS
WEIGHT: 201 LBS | SYSTOLIC BLOOD PRESSURE: 132 MMHG | DIASTOLIC BLOOD PRESSURE: 94 MMHG | HEIGHT: 62 IN | BODY MASS INDEX: 36.99 KG/M2 | HEART RATE: 83 BPM

## 2024-05-15 DIAGNOSIS — I44.7 LEFT BUNDLE BRANCH BLOCK (LBBB): Primary | ICD-10-CM

## 2024-05-15 DIAGNOSIS — R00.1 SINUS BRADYCARDIA: ICD-10-CM

## 2024-05-15 DIAGNOSIS — I42.0 CARDIOMYOPATHY, DILATED: Primary | ICD-10-CM

## 2024-05-15 DIAGNOSIS — Z95.810 PRESENCE OF BIVENTRICULAR IMPLANTABLE CARDIOVERTER-DEFIBRILLATOR (ICD): ICD-10-CM

## 2024-05-15 PROCEDURE — 3080F DIAST BP >= 90 MM HG: CPT | Performed by: INTERNAL MEDICINE

## 2024-05-15 PROCEDURE — 93000 ELECTROCARDIOGRAM COMPLETE: CPT | Performed by: INTERNAL MEDICINE

## 2024-05-15 PROCEDURE — 1159F MED LIST DOCD IN RCRD: CPT | Performed by: INTERNAL MEDICINE

## 2024-05-15 PROCEDURE — 99214 OFFICE O/P EST MOD 30 MIN: CPT | Performed by: INTERNAL MEDICINE

## 2024-05-15 PROCEDURE — 1160F RVW MEDS BY RX/DR IN RCRD: CPT | Performed by: INTERNAL MEDICINE

## 2024-05-15 PROCEDURE — 3075F SYST BP GE 130 - 139MM HG: CPT | Performed by: INTERNAL MEDICINE

## 2024-05-15 RX ORDER — INSULIN GLARGINE AND LIXISENATIDE 100; 33 U/ML; UG/ML
INJECTION, SOLUTION SUBCUTANEOUS DAILY
COMMUNITY
Start: 2024-02-28

## 2024-05-15 RX ORDER — INSULIN ASPART INJECTION 100 [IU]/ML
4 INJECTION, SOLUTION SUBCUTANEOUS 3 TIMES DAILY
COMMUNITY
Start: 2023-11-29

## 2024-05-15 NOTE — PROGRESS NOTES
"Date of Office Visit: 05/15/2024  Encounter Provider: Noel Peña MD  Place of Service: [facility] CARDIOLOGY  Patient Name: Petra Soto  : 1950    Subjective:     Encounter Date:05/15/2024      Patient ID: Petra Soto is a 73 y.o. female who has a cc of  pt of Dr LUCAS who I put in a CRT-D in  for LBB and HFrEF     She has had two episodes of being jerked up in bed bya thump in chest and then her heart races.     The patient had a good year.   No anginal chest pain,   No sig bland,   No soa,   No fainting,  No orthostasis.   No edema.   Exercise tolerance: good.     There have been no hospital admission since the last visit.     There have been no bleeding events.       Past Medical History:   Diagnosis Date    Bilateral carotid artery stenosis     Carotid atherosclerosis     nonosbstructive, minimal by US 3/2016 (<15% bilaterally)    Coronary artery disease     MI , s/p PCI-LAD vs diagonal (3*13mm Duet), f/b near immediate CABG (LIMA-diag, SVG-LAD).  Cath  with normal LM/LCx/RCA,  LAD,  SVG-LAD, patent LIMA-diag, R-L collaterals    Diabetes mellitus     Essential hypertension     Hyperlipidemia     Ischemic cardiomyopathy     mild, EF 45%    Left bundle branch block     Multiple URI     Presence of biventricular implantable cardioverter-defibrillator (ICD)     Symptomatic sinus bradycardia        Social History     Socioeconomic History    Marital status:    Tobacco Use    Smoking status: Never    Smokeless tobacco: Never   Vaping Use    Vaping status: Never Used   Substance and Sexual Activity    Alcohol use: No     Comment: caffeine use: 'not often\"    Drug use: No    Sexual activity: Defer       Family History   Problem Relation Age of Onset    Colon cancer Mother     Stomach cancer Mother     Heart disease Father     Heart failure Father     Hypertension Sister     Thyroid disease Sister     Hypertension Brother        Review of Systems   Constitutional: " "Negative for fever and night sweats.   HENT:  Negative for ear pain and stridor.    Eyes:  Negative for discharge and visual halos.   Cardiovascular:  Negative for cyanosis.   Respiratory:  Negative for hemoptysis and sputum production.    Hematologic/Lymphatic: Negative for adenopathy.   Skin:  Negative for nail changes and unusual hair distribution.   Musculoskeletal:  Positive for arthritis and joint pain. Negative for gout and joint swelling.   Gastrointestinal:  Negative for bowel incontinence and flatus.   Genitourinary:  Negative for dysuria and flank pain.   Neurological:  Negative for seizures and tremors.   Psychiatric/Behavioral:  Negative for altered mental status. The patient is not nervous/anxious.             Objective:     Vitals:    05/15/24 0920   BP: 132/94   Pulse: 83   Weight: 91.2 kg (201 lb)   Height: 157.5 cm (62.01\")         Eyes:      General:         Right eye: No discharge.         Left eye: No discharge.   HENT:      Head: Normocephalic and atraumatic.   Neck:      Thyroid: No thyromegaly.      Vascular: No JVD.   Pulmonary:      Effort: Pulmonary effort is normal.      Breath sounds: Normal breath sounds. No rales.   Cardiovascular:      Normal rate. Regular rhythm.      No gallop.    Edema:     Peripheral edema absent.   Abdominal:      General: Bowel sounds are normal.      Palpations: Abdomen is soft.      Tenderness: There is no abdominal tenderness.   Musculoskeletal: Normal range of motion.         General: No deformity. Skin:     General: Skin is warm and dry.      Findings: No erythema.   Neurological:      Mental Status: Alert and oriented to person, place, and time.      Motor: Normal muscle tone.   Psychiatric:         Behavior: Behavior normal.         Thought Content: Thought content normal.           ECG 12 Lead    Date/Time: 5/15/2024 10:12 AM  Performed by: Noel Peña MD    Authorized by: Noel Peña MD  Comparison: compared with previous ECG   Similar to " previous ECG  Rhythm: sinus rhythm and paced          Lab Review:       Assessment:          Diagnosis Plan   1. Left bundle branch block (LBBB)        2. Sinus bradycardia        3. Presence of biventricular implantable cardioverter-defibrillator (ICD)               Plan:     As for the thumping episodes I would say do nothing now but if there is a trend then I can do a pa and lat CVR and determine best lead (farthest from the phrenic) and change programming But she is so good now I say stay the course     I reviewed the pacemaker/ICD tracings and the pacing and sensing parameters are normal.  AF burden is 0     HFrEF -- per dr stephens[p

## 2024-08-12 NOTE — PROGRESS NOTES
"RM:________     PCP: Melony Flores MD    : 1950  AGE: 73 y.o.  EST PATIENT     REASON FOR VISIT/  CC:        BP Readings from Last 3 Encounters:   05/15/24 132/94   23 126/80   23 122/84      Wt Readings from Last 3 Encounters:   05/15/24 91.2 kg (201 lb)   23 84.8 kg (187 lb)   23 85.5 kg (188 lb 9.6 oz)        WT: ____________ BP: __________L __________R HR______    CHEST PAIN: _____________    SOA: _____________PALPS: _______________     LIGHTHEADED: ___________FATIGUE: ________________ EDEMA __________    ALLERGIES:Contrast dye (echo or unknown ct/mr), Iodinated contrast media, Morphine and codeine, and Oxycodone SMOKING HISTORY:  Social History     Tobacco Use    Smoking status: Never    Smokeless tobacco: Never   Vaping Use    Vaping status: Never Used   Substance Use Topics    Alcohol use: No     Comment: caffeine use: 'not often\"    Drug use: No     CAFFEINE USE_________________  ALCOHOL ______________________    "

## 2024-08-14 ENCOUNTER — CLINICAL SUPPORT NO REQUIREMENTS (OUTPATIENT)
Age: 74
End: 2024-08-14
Payer: MEDICARE

## 2024-08-14 ENCOUNTER — OFFICE VISIT (OUTPATIENT)
Dept: CARDIOLOGY | Facility: CLINIC | Age: 74
End: 2024-08-14
Payer: MEDICARE

## 2024-08-14 VITALS
BODY MASS INDEX: 36.8 KG/M2 | WEIGHT: 200 LBS | DIASTOLIC BLOOD PRESSURE: 90 MMHG | SYSTOLIC BLOOD PRESSURE: 128 MMHG | HEIGHT: 62 IN | HEART RATE: 65 BPM

## 2024-08-14 DIAGNOSIS — I44.7 LEFT BUNDLE BRANCH BLOCK (LBBB): ICD-10-CM

## 2024-08-14 DIAGNOSIS — Z95.810 PRESENCE OF BIVENTRICULAR IMPLANTABLE CARDIOVERTER-DEFIBRILLATOR (ICD): ICD-10-CM

## 2024-08-14 DIAGNOSIS — R42 VERTIGO: ICD-10-CM

## 2024-08-14 DIAGNOSIS — I25.5 ISCHEMIC CARDIOMYOPATHY: ICD-10-CM

## 2024-08-14 DIAGNOSIS — I25.5 ISCHEMIC CARDIOMYOPATHY: Primary | ICD-10-CM

## 2024-08-14 DIAGNOSIS — R00.1 SINUS BRADYCARDIA: ICD-10-CM

## 2024-08-14 DIAGNOSIS — I10 ESSENTIAL HYPERTENSION: ICD-10-CM

## 2024-08-14 DIAGNOSIS — I25.10 CORONARY ARTERY DISEASE INVOLVING NATIVE CORONARY ARTERY OF NATIVE HEART WITHOUT ANGINA PECTORIS: Primary | ICD-10-CM

## 2024-08-14 DIAGNOSIS — E78.2 MIXED HYPERLIPIDEMIA: ICD-10-CM

## 2024-08-14 DIAGNOSIS — R00.2 PALPITATIONS: ICD-10-CM

## 2024-08-14 RX ORDER — MECLIZINE HCL 12.5 MG/1
12.5 TABLET ORAL 3 TIMES DAILY PRN
COMMUNITY

## 2024-08-14 NOTE — PROGRESS NOTES
Date of Office Visit: 2024  Encounter Provider: Sherwin Robertson MD  Place of Service: [facility] CARDIOLOGY  Patient Name: Petra Soto  :1950    Chief Complaint   Patient presents with    Left bundle branch block (LBBB)    Coronary artery disease involving native coronary artery of    Cardiomyopathy   :     HPI: Petra Soto is a 73 y.o. female who presents today in follow up. I have reviewed prior notes and there are no changes except for any new updates described below. I have also reviewed any information entered into the medical record by the patient or by ancillary staff.     In , she suffered a myocardial infarction. Presumably this was due to occlusion of the left anterior descending artery. She underwent angioplasty and stenting but this failed and she then underwent coronary artery bypass grafting with a LIMA to the diagonal and a saphenous vein graft to the LAD. In , she underwent cardiac catheterization which revealed complete occlusion of the LAD as well as complete occlusion of the saphenous vein graft to the LAD. All other vessels were normal and her LIMA to the diagonal was normal as well. Her LVEF had been stable at 45% for years, and she had a LBBB.     She presented in 2021 with progressive dyspnea and angina. She was found to be in decompensated CHF and suffered a type 2 myocardial infarction. An echo revealed a decline in LVEF to 25% with a dyskinetic septum due to a left bundle. Coronary angiography revealed a patent LIMA-D1, but the SVG-LAD was occluded. The native LAD was occluded distally. There was 40-50% disease elsewhere.  Her metoprolol dose was increased at discharge, and she was diuresed. Unfortunately, she struggled with severe sinus bradycardia after this, even despite decreasing her BB. In 2021, she underwent CRT-D implantation ([employer]). By 2022, her LVEF had improved to 50%.     She had a carotid duplex  "in 2023 that showed mild plaquing but no stenosis.     She has intermittent dizziness that occurs when she turns her head to the right.  It is a spinning type of dizziness associated with nausea.  She does not have lightheadedness or syncope.  She denies angina.  On July 7 she had an episode where her heart \"jumped.\"  It felt like it was beating hard but it was very brief.  She has not had chest pain.    Past Medical History:   Diagnosis Date    Bilateral carotid artery stenosis     Carotid atherosclerosis     nonosbstructive, minimal by US 3/2016 (<15% bilaterally)    Coronary artery disease     MI 1999, s/p PCI-LAD vs diagonal (3*13mm Duet), f/b near immediate CABG (LIMA-diag, SVG-LAD).  Cath 2005 with normal LM/LCx/RCA,  LAD,  SVG-LAD, patent LIMA-diag, R-L collaterals    Diabetes mellitus     Essential hypertension     Hyperlipidemia     Ischemic cardiomyopathy     mild, EF 45%    Left bundle branch block     Multiple URI     Presence of biventricular implantable cardioverter-defibrillator (ICD)     Symptomatic sinus bradycardia        Past Surgical History:   Procedure Laterality Date    BLADDER SURGERY  12/2017    CARDIAC CATHETERIZATION N/A 11/29/2021    Procedure: Left Heart Cath;  Surgeon: Valerie Hoover MD;  Location: [facility] CATH INVASIVE LOCATION;  Service: Cardiovascular;  Laterality: N/A;    CARDIAC CATHETERIZATION N/A 11/29/2021    Procedure: Left ventriculography;  Surgeon: Valerie Hoover MD;  Location: [facility] CATH INVASIVE LOCATION;  Service: Cardiovascular;  Laterality: N/A;    CARDIAC CATHETERIZATION N/A 11/29/2021    Procedure: Coronary angiography;  Surgeon: Valerie Hoover MD;  Location: [facility] CATH INVASIVE LOCATION;  Service: Cardiovascular;  Laterality: N/A;    CARDIAC CATHETERIZATION  11/29/2021    Procedure: Saphenous Vein Graft;  Surgeon: Valerie Hoover MD;  Location: [facility] CATH INVASIVE LOCATION;  Service: Cardiovascular;;    CARDIAC CATHETERIZATION N/A 11/29/2021    " "Procedure: Native mammary injection;  Surgeon: Valerie Hoover MD;  Location:  [facility] CATH INVASIVE LOCATION;  Service: Cardiovascular;  Laterality: N/A;    CARDIAC ELECTROPHYSIOLOGY PROCEDURE N/A 12/20/2021    Procedure: Biventricular Device Insertion- BIV ICD- [employer];  Surgeon: Noel Peña MD;  Location:  [facility] CATH INVASIVE LOCATION;  Service: Cardiovascular;  Laterality: N/A;    CATARACT EXTRACTION, BILATERAL      CORONARY ARTERY BYPASS GRAFT      HYSTERECTOMY      PERIPHERAL ARTERIAL STENT GRAFT      SUBMANDIBULAR GLAND EXCISION         Social History     Socioeconomic History    Marital status:    Tobacco Use    Smoking status: Never    Smokeless tobacco: Never   Vaping Use    Vaping status: Never Used   Substance and Sexual Activity    Alcohol use: No     Comment: caffeine use: 'not often\"    Drug use: No    Sexual activity: Defer       Family History   Problem Relation Age of Onset    Colon cancer Mother     Stomach cancer Mother     Heart disease Father     Heart failure Father     Hypertension Sister     Thyroid disease Sister     Hypertension Brother        Review of Systems   Constitutional: Positive for malaise/fatigue.   HENT:  Positive for hearing loss.    Cardiovascular:  Positive for dyspnea on exertion. Negative for irregular heartbeat and leg swelling.   Respiratory:  Negative for shortness of breath.    Neurological:  Negative for dizziness and light-headedness.   All other systems reviewed and are negative.      Allergies   Allergen Reactions    Contrast Dye (Echo Or Unknown Ct/Mr) Shortness Of Breath    Iodinated Contrast Media Shortness Of Breath    Morphine And Codeine Nausea And Vomiting    Oxycodone Nausea And Vomiting         Current Outpatient Medications:     amLODIPine (NORVASC) 5 MG tablet, Take 1 tablet by mouth Daily., Disp: 90 tablet, Rfl: 1    aspirin 81 MG tablet, Take 1 tablet by mouth Daily., Disp: 30 tablet, Rfl: 0    atorvastatin (LIPITOR) 20 MG tablet, TAKE ONE " TABLET BY MOUTH DAILY, Disp: , Rfl:     Calcium Citrate-Vitamin D (CALCIUM CITRATE + D3 PO), Take 1 tablet by mouth Daily., Disp: , Rfl:     cetirizine (zyrTEC) 10 MG tablet, Take 1 tablet by mouth Daily., Disp: , Rfl:     cyanocobalamin 1000 MCG/ML injection, Inject 1 mL into the appropriate muscle as directed by prescriber Every 30 (Thirty) Days., Disp: , Rfl:     estradiol (ESTRACE) 0.1 MG/GM vaginal cream, Insert  into the vagina 3 (Three) Times a Week., Disp: , Rfl:     Fiasp FlexTouch 100 UNIT/ML solution pen-injector, Inject 4 Units under the skin into the appropriate area as directed 3 times a day., Disp: , Rfl:     glucose blood test strip, Test Blood Sugar two to three times a day as directed , Diagnosis E11.9, Accu-Check Smartview Test Strips, Disp: 300 each, Rfl: 1    Insulin Aspart FlexPen 100 UNIT/ML solution pen-injector, Inject 10 Units under the skin into the appropriate area as directed., Disp: , Rfl:     isosorbide mononitrate (IMDUR) 60 MG 24 hr tablet, TAKE ONE TABLET BY MOUTH DAILY, Disp: 90 tablet, Rfl: 0    lisinopril (PRINIVIL,ZESTRIL) 40 MG tablet, TAKE ONE TABLET BY MOUTH DAILY, Disp: 90 tablet, Rfl: 0    meclizine (ANTIVERT) 12.5 MG tablet, Take 1 tablet by mouth 3 (Three) Times a Day As Needed for Dizziness., Disp: , Rfl:     metoprolol succinate XL (TOPROL-XL) 100 MG 24 hr tablet, TAKE 1 TABLET BY MOUTH DAILY, Disp: 90 tablet, Rfl: 1    multivitamin (THERAGRAN) tablet tablet, Take 1 tablet by mouth daily., Disp: 30 tablet, Rfl: 0    nitroglycerin (NITROSTAT) 0.4 MG SL tablet, Place 1 tablet under the tongue Every 5 (Five) Minutes As Needed for Chest Pain. Take no more than 3 doses in 15 minutes., Disp: 30 tablet, Rfl: 2    polyethyl glycol-propyl glycol (SYSTANE) 0.4-0.3 % solution ophthalmic solution (artificial tears), Administer 1 drop to both eyes Daily., Disp: , Rfl:     Soliqua 100-33 UNT-MCG/ML solution pen-injector injection, Inject  under the skin into the appropriate area as  "directed Daily., Disp: , Rfl:     Insulin Detemir (LEVEMIR FLEXTOUCH SC), Inject 6 Units under the skin into the appropriate area as directed Daily As Needed. (Patient not taking: Reported on 8/14/2024), Disp: , Rfl:      Objective:     Vitals:    08/14/24 1102   BP: 128/90   BP Location: Left arm   Patient Position: Sitting   Cuff Size: Large Adult   Pulse: 65   Weight: 90.7 kg (200 lb)   Height: 157.5 cm (62.01\")     Body mass index is 36.57 kg/m².    Physical Exam  Constitutional:       General: She is not in acute distress.  HENT:      Head: Normocephalic.      Nose: Nose normal.      Mouth/Throat:      Pharynx: Oropharynx is clear.   Eyes:      Conjunctiva/sclera: Conjunctivae normal.   Cardiovascular:      Rate and Rhythm: Normal rate and regular rhythm.      Pulses: Normal pulses.      Heart sounds: Normal heart sounds.   Pulmonary:      Effort: Pulmonary effort is normal.      Breath sounds: Normal breath sounds.   Abdominal:      Palpations: Abdomen is soft.      Tenderness: There is no abdominal tenderness.   Musculoskeletal:         General: No swelling.   Skin:     General: Skin is warm and dry.   Neurological:      General: No focal deficit present.      Mental Status: She is alert.   Psychiatric:         Mood and Affect: Mood normal.       Procedures  EKG --   I have personally reviewed EKG on 5/15/2024 and my interpretation of the tracing is as follows: AV paced, no chanage      Assessment:       Diagnosis Plan   1. Coronary artery disease involving native coronary artery of native heart without angina pectoris        2. Mixed hyperlipidemia        3. Ischemic cardiomyopathy        4. Left bundle branch block (LBBB)        5. Sinus bradycardia        6. Presence of biventricular implantable cardioverter-defibrillator (ICD)        7. Palpitations        8. Essential hypertension        9. Vertigo  Ambulatory Referral to Physical Therapy for Evaluation & Treatment        Plan:       1/2.  Coronary " "Artery Disease  Assessment   The patient has CCS class I - angina only during strenuous or prolonged physical activity   The patient has stable angina     Continue atorvastatin.    Subjective - Objective   There is a history of previous coronary artery bypass graft  1999   Current antiplatelet therapy includes aspirin 81 mg      She had stable CAD by cath in 2021.    3- 8.  For years, her ejection fraction was approximately 40%, and her heart rate was approximately 50 bpm.  Then she developed progressive left ventricular systolic dysfunction associated with acute congestive heart failure as well as sick sinus syndrome. She underwent CRT-D in December 2021 and her EF improved to 50%.     She will remain on metoprolol, amlodipine, ISMN, and lisinopril.     She reports the sensation where her heart \"jumps\" but it is brief.  I sent her down for a device interrogation and no events were noted.    9. She reports intermittent vertiginous dizziness associated with nausea.  This occurs when she turns her head to the right.  I have referred her to vestibular physical therapy.      Sincerely,       Sherwin Robertson MD                  "

## 2024-09-18 ENCOUNTER — HOSPITAL ENCOUNTER (OUTPATIENT)
Dept: PHYSICAL THERAPY | Facility: HOSPITAL | Age: 74
Setting detail: THERAPIES SERIES
Discharge: HOME | End: 2024-09-18
Payer: MEDICARE

## 2024-09-18 DIAGNOSIS — H81.10 BPPV (BENIGN PAROXYSMAL POSITIONAL VERTIGO), UNSPECIFIED LATERALITY: ICD-10-CM

## 2024-09-18 DIAGNOSIS — R42 VERTIGO: Primary | ICD-10-CM

## 2024-09-18 PROCEDURE — 95992 CANALITH REPOSITIONING PROC: CPT

## 2024-09-18 PROCEDURE — 97161 PT EVAL LOW COMPLEX 20 MIN: CPT

## 2024-09-26 ENCOUNTER — HOSPITAL ENCOUNTER (OUTPATIENT)
Dept: PHYSICAL THERAPY | Facility: HOSPITAL | Age: 74
Setting detail: THERAPIES SERIES
Discharge: HOME | End: 2024-09-26
Payer: MEDICARE

## 2024-09-26 DIAGNOSIS — H81.10 BPPV (BENIGN PAROXYSMAL POSITIONAL VERTIGO), UNSPECIFIED LATERALITY: ICD-10-CM

## 2024-09-26 DIAGNOSIS — R42 VERTIGO: Primary | ICD-10-CM

## 2024-09-26 PROCEDURE — 97530 THERAPEUTIC ACTIVITIES: CPT

## 2024-09-26 PROCEDURE — 95992 CANALITH REPOSITIONING PROC: CPT

## 2024-10-29 NOTE — TELEPHONE ENCOUNTER
Failed medication protocol checklist.   Lov 8/14/24 with a BP of 128/90.   Nov 8/20/25.   Last labs were 7/24/24 at [place].

## 2024-10-30 RX ORDER — METOPROLOL SUCCINATE 100 MG/1
100 TABLET, EXTENDED RELEASE ORAL DAILY
Qty: 90 TABLET | Refills: 3 | Status: SHIPPED | OUTPATIENT
Start: 2024-10-30

## 2024-11-14 PROCEDURE — 93297 REM INTERROG DEV EVAL ICPMS: CPT | Performed by: INTERNAL MEDICINE

## 2024-11-18 ENCOUNTER — OFFICE VISIT (OUTPATIENT)
Age: 74
End: 2024-11-18
Payer: MEDICARE

## 2024-11-18 ENCOUNTER — CLINICAL SUPPORT NO REQUIREMENTS (OUTPATIENT)
Age: 74
End: 2024-11-18
Payer: MEDICARE

## 2024-11-18 VITALS
WEIGHT: 206 LBS | HEIGHT: 62 IN | SYSTOLIC BLOOD PRESSURE: 140 MMHG | HEART RATE: 81 BPM | DIASTOLIC BLOOD PRESSURE: 94 MMHG | BODY MASS INDEX: 37.91 KG/M2

## 2024-11-18 DIAGNOSIS — I25.10 CORONARY ARTERY DISEASE INVOLVING NATIVE CORONARY ARTERY OF NATIVE HEART WITHOUT ANGINA PECTORIS: ICD-10-CM

## 2024-11-18 DIAGNOSIS — Z95.810 PRESENCE OF BIVENTRICULAR IMPLANTABLE CARDIOVERTER-DEFIBRILLATOR (ICD): ICD-10-CM

## 2024-11-18 DIAGNOSIS — I10 ESSENTIAL HYPERTENSION: ICD-10-CM

## 2024-11-18 DIAGNOSIS — I25.5 ISCHEMIC CARDIOMYOPATHY: Primary | ICD-10-CM

## 2024-11-18 PROBLEM — R00.1 SINUS BRADYCARDIA: Status: RESOLVED | Noted: 2017-03-20 | Resolved: 2024-11-18

## 2024-11-18 PROCEDURE — 93000 ELECTROCARDIOGRAM COMPLETE: CPT | Performed by: PHYSICIAN ASSISTANT

## 2024-11-18 PROCEDURE — 1160F RVW MEDS BY RX/DR IN RCRD: CPT | Performed by: PHYSICIAN ASSISTANT

## 2024-11-18 PROCEDURE — 3080F DIAST BP >= 90 MM HG: CPT | Performed by: PHYSICIAN ASSISTANT

## 2024-11-18 PROCEDURE — 99214 OFFICE O/P EST MOD 30 MIN: CPT | Performed by: PHYSICIAN ASSISTANT

## 2024-11-18 PROCEDURE — 1159F MED LIST DOCD IN RCRD: CPT | Performed by: PHYSICIAN ASSISTANT

## 2024-11-18 PROCEDURE — 3077F SYST BP >= 140 MM HG: CPT | Performed by: PHYSICIAN ASSISTANT

## 2024-11-18 NOTE — PROGRESS NOTES
"      ELECTROPHYSIOLOGY   Date of Office Visit: 2024  Patient Name: Petra Soto  : 1950  Encounter Provider: Edouard Breaux PA-C  Primary Cardiologist: Sherwin Robertson MD  Electrophysiologist: Dr. Peña  CHIEF COMPLAINT / REASON FOR OFFICE VISIT     LBBB, Cardiomyopathy, sinus bradycardia, and Pacemaker Check  6 month follow up     HISTORY OF PRESENT ILLNESS     This is a 74 y.o. year old female who presents to [facility] CARDIOLOGY for a for a 6 month follow up.     She had a prior MI in  with subsequent CABG with LIMA to diag and SVG to LAD. Cath in  showed occluded LAD and SVG to LAD.     In 2021, she started to have symptomatic HFrEF in setting of low EF and LBBB. She was recommended for a CRT-D. We have been following since. Most recent echo in  with EF 50%    Device interrogation today shows normal device function with no events. She is dependent with rates  in the 80s. She has 98.3% biv paced beats.     Today the patient reports she is doing well. She really has not had any new issues other than post nasal drip resulting in vertigo. She was given a new nasal spray and used it one time. She then had a sudden awaking episode that night and was worried this was the cause.     She has not had any issues with dizziness, nausea, vomiting or near syncope. She has not had any chest pain or pressure.     She was accompanied with her  for today's visit.     Blood pressure at home is 100-110s/ 70s at home.     PMHx:  HTN, HL, ICM s/p CRT-D pacer, DM type II, LBBB, Vitamin D deficiency     PHYSICAL EXAMINATION     Vital Signs:  /94 (BP Location: Left arm, Patient Position: Sitting)   Pulse 81   Ht 157.5 cm (62.01\")   Wt 93.4 kg (206 lb)   BMI 37.67 kg/m²   Estimated body mass index is 37.67 kg/m² as calculated from the following:    Height as of this encounter: 157.5 cm (62.01\").    Weight as of this encounter: 93.4 kg (206 lb).         "       Physical Exam  Constitutional:       Appearance: Normal appearance.   HENT:      Head: Normocephalic and atraumatic.   Cardiovascular:      Rate and Rhythm: Normal rate and regular rhythm.      Pulses: Normal pulses.      Heart sounds: Normal heart sounds.      Comments: Pacemaker site well healed  Pulmonary:      Effort: Pulmonary effort is normal.      Breath sounds: Normal breath sounds.   Musculoskeletal:      Right lower leg: No edema.      Left lower leg: No edema.   Skin:     General: Skin is warm and dry.   Neurological:      General: No focal deficit present.      Mental Status: She is alert and oriented to person, place, and time.          Cardiac Testing/Results     Cardiac Testing:   - Echo 4/13/2022: EF 50% with mild to moderate LVH. RVSP 32 mmHg.     Result Review :  The following data was reviewed by: Edouard Breaux PA-C on 11/18/2024:       Lab Results   Component Value Date     03/01/2023     11/25/2022    K 4.2 03/01/2023    K 4.5 11/25/2022     03/01/2023     11/25/2022    CO2 23 03/01/2023    CO2 24 11/25/2022    BUN 12 03/01/2023    BUN 16 11/25/2022    CREATININE 0.70 03/01/2023    CREATININE 0.76 11/25/2022    EGFRIFNONA 74 12/17/2021    EGFRIFNONA 72 11/29/2021    EGFRIFAFRI >60 03/01/2023    EGFRIFAFRI >60 11/25/2022    GLUCOSE 152 (H) 12/17/2021    GLUCOSE 333 (H) 11/29/2021    CALCIUM 9.5 03/01/2023    CALCIUM 9.8 11/25/2022    ALBUMIN 4.4 03/01/2023    ALBUMIN 4.5 11/25/2022    AST 31 03/01/2023    AST 21 11/25/2022    ALT 27 03/01/2023    ALT 14 11/25/2022     Lab Results   Component Value Date    WBC 9.93 07/24/2024    WBC 7.40 03/27/2024    HGB 13.4 07/24/2024    HGB 13.1 03/27/2024    HCT 40.2 07/24/2024    HCT 40.6 03/27/2024    MCV 83.1 07/24/2024    MCV 83.2 03/27/2024     07/24/2024     03/27/2024     Lab Results   Component Value Date    PROBNP 1,087.0 (H) 11/28/2021     Lab Results   Component Value Date    CKTOTAL 73 12/27/2016     TROPONINT 0.181 (C) 11/29/2021     Lab Results   Component Value Date    TSH 2.550 10/27/2021    TSH 1.820 06/30/2021                 ECG 12 Lead    Date/Time: 11/18/2024 11:33 AM  Performed by: Edouard Cordoba PA-C    Authorized by: Edouard Cordoba PA-C  Comparison: compared with previous ECG from 5/15/2024  Rhythm: paced  Rate: normal  BPM: 81  QRS axis: left                ASSESSMENT & PLAN       Diagnoses and all orders for this visit:    1. Ischemic cardiomyopathy (Primary)  Longstanding history of ischemic cardiomyopathy with most recent ejection fraction showing recovered EF at 50% after CRT placement  No new symptoms on exam today  She is maintained on medical therapy in the form of lisinopril, Toprol  If she has any new symptoms she will contact the office  2. Presence of biventricular implantable cardioverter-defibrillator (ICD)  Normal device function with effective CRT pacing at 93.8%  No new episodes or events.  I do not think that her episode of startled awakening at sleep was related to an arrhythmia.  Recommended that she go back on her nasal spray to see if it helps with her postnasal drip symptoms  3. Coronary artery disease involving native coronary artery of native heart without angina pectoris  History of previous MI with known occluded LAD and occluded bypass graft to the LAD.  No new anginal symptoms today but she does maintain a regimen of metoprolol, isosorbide, aspirin and statin  She will follow with Dr. Robertson team long-term for this  EKG with no T wave changes.   4. Essential hypertension  She has self pronounced white coat hypertension, home BP is well controlled. Continue current meds      Follow Up:  Return in about 1 year (around 11/18/2025) for Dr. Peña- Routine, Device check.  Patient was given instructions and counseling regarding her condition or for health maintenance advice. Please contact office if worsening symptoms or proceed to ER when  appropriate.      Edouard Breaux PA-C  11/18/24  12:07 EST    MEDICATIONS         Discharge Medications            Accurate as of November 18, 2024 12:07 PM. If you have any questions, ask your nurse or doctor.                Continue These Medications        Instructions Start Date   amLODIPine 5 MG tablet  Commonly known as: NORVASC   5 mg, Oral, Daily      aspirin 81 MG tablet  Commonly known as: Ivana Aspirin   81 mg, Oral, Daily      atorvastatin 20 MG tablet  Commonly known as: LIPITOR   TAKE ONE TABLET BY MOUTH DAILY      CALCIUM CITRATE + D3 PO   1 tablet, Daily      cetirizine 10 MG tablet  Commonly known as: zyrTEC   10 mg, Daily      cyanocobalamin 1000 MCG/ML injection   1,000 mcg, Every 30 Days      estradiol 0.1 MG/GM vaginal cream  Commonly known as: ESTRACE   3 Times Weekly      Fiasp FlexTouch 100 UNIT/ML solution pen-injector  Generic drug: Insulin Aspart (w/Niacinamide)   4 Units, 3 times daily      glucose blood test strip   Test Blood Sugar two to three times a day as directed , Diagnosis E11.9, Accu-Check Smartview Test Strips      Insulin Aspart FlexPen 100 UNIT/ML solution pen-injector   10 Units      isosorbide mononitrate 60 MG 24 hr tablet  Commonly known as: IMDUR   TAKE ONE TABLET BY MOUTH DAILY      LEVEMIR FLEXTOUCH SC   6 Units, Daily PRN      lisinopril 40 MG tablet  Commonly known as: PRINIVIL,ZESTRIL   TAKE ONE TABLET BY MOUTH DAILY      meclizine 12.5 MG tablet  Commonly known as: ANTIVERT   12.5 mg, 3 Times Daily PRN      metoprolol succinate  MG 24 hr tablet  Commonly known as: TOPROL-XL   100 mg, Oral, Daily      multivitamin tablet tablet   1 tablet, Oral, Daily      nitroglycerin 0.4 MG SL tablet  Commonly known as: NITROSTAT   0.4 mg, Sublingual, Every 5 Minutes PRN, Take no more than 3 doses in 15 minutes.      polyethyl glycol-propyl glycol 0.4-0.3 % solution ophthalmic solution (artificial tears)  Commonly known as: SYSTANE   1 drop, Daily      Soliqua 100-33  UNT-MCG/ML solution pen-injector injection  Generic drug: Insulin Glargine-Lixisenatide   Daily                   **Yoana Disclaimer: This note was dictated using an electronic transcription. The electronic translation of spoken language may permit erroneous, or at times, nonsensical words or phrases to be inadvertently transcribed. Although I have reviewed the note for such errors, some may still exist.

## 2024-11-18 NOTE — PATIENT INSTRUCTIONS
Keep Blood pressure log with goal of -140 and DBP  60-90, contact office if multiple readings out of range    Limit salt to < 3 mg daily per AHA guidelines    Quality 397: Melanoma: Reporting: The pathology report includes a pT Category and statement on thickness and ulceration for pT1, mitotic rate. Quality 431: Preventive Care And Screening: Unhealthy Alcohol Use - Screening: Patient not identified as an unhealthy alcohol user when screened for unhealthy alcohol use using a systematic screening method Quality 138: Melanoma: Coordination Of Care: A treatment plan was communicated to the physicians providing continuing care within one month of diagnosis outlining: diagnosis, tumor thickness and a plan for surgery or alternate care. Quality 137: Melanoma: Continuity Of Care - Recall System: Patient information entered into a recall system that includes: target date for the next exam specified AND a process to follow up with patients regarding missed or unscheduled appointments Quality 130: Documentation Of Current Medications In The Medical Record: Current Medications Documented Detail Level: Detailed Quality 226: Preventive Care And Screening: Tobacco Use: Screening And Cessation Intervention: Patient screened for tobacco use and is an ex/non-smoker

## 2025-03-14 PROCEDURE — 93296 REM INTERROG EVL PM/IDS: CPT | Performed by: INTERNAL MEDICINE

## 2025-03-14 PROCEDURE — 93295 DEV INTERROG REMOTE 1/2/MLT: CPT | Performed by: INTERNAL MEDICINE

## 2025-08-04 LAB
MC_CV_MDC_IDC_RATE_1: 167
MC_CV_MDC_IDC_RATE_1: 171
MC_CV_MDC_IDC_RATE_1: 188
MC_CV_MDC_IDC_RV_HV_IMPEDANCE: 75
MC_CV_MDC_IDC_THERAPIES: NORMAL
MC_CV_MDC_IDC_ZONE_ID: 2
MC_CV_MDC_IDC_ZONE_ID: 3
MC_CV_MDC_IDC_ZONE_ID: 4
MC_CV_MDC_IDC_ZONE_ID: 5
MC_CV_MDC_IDC_ZONE_ID: 6
MDC_IDC_MSMT_BATTERY_REMAINING_LONGEVITY: 60 MO
MDC_IDC_MSMT_BATTERY_RRT_TRIGGER: 2.8
MDC_IDC_MSMT_BATTERY_VOLTAGE: 2.95
MDC_IDC_MSMT_CAP_CHARGE_TIME: 3.9
MDC_IDC_MSMT_LEADCHNL_LV_DTM: NORMAL
MDC_IDC_MSMT_LEADCHNL_LV_IMPEDANCE_VALUE: 684
MDC_IDC_MSMT_LEADCHNL_LV_PACING_THRESHOLD_AMPLITUDE: 0.38
MDC_IDC_MSMT_LEADCHNL_LV_PACING_THRESHOLD_POLARITY: NORMAL
MDC_IDC_MSMT_LEADCHNL_LV_PACING_THRESHOLD_PULSEWIDTH: 1
MDC_IDC_MSMT_LEADCHNL_RA_DTM: NORMAL
MDC_IDC_MSMT_LEADCHNL_RA_IMPEDANCE_VALUE: 380
MDC_IDC_MSMT_LEADCHNL_RA_PACING_THRESHOLD_AMPLITUDE: 0.75
MDC_IDC_MSMT_LEADCHNL_RA_PACING_THRESHOLD_POLARITY: NORMAL
MDC_IDC_MSMT_LEADCHNL_RA_PACING_THRESHOLD_PULSEWIDTH: 0.4
MDC_IDC_MSMT_LEADCHNL_RA_SENSING_INTR_AMPL: 3.8
MDC_IDC_MSMT_LEADCHNL_RV_DTM: NORMAL
MDC_IDC_MSMT_LEADCHNL_RV_IMPEDANCE_VALUE: 589
MDC_IDC_MSMT_LEADCHNL_RV_PACING_THRESHOLD_AMPLITUDE: 0.62
MDC_IDC_MSMT_LEADCHNL_RV_PACING_THRESHOLD_POLARITY: NORMAL
MDC_IDC_MSMT_LEADCHNL_RV_PACING_THRESHOLD_PULSEWIDTH: 0.4
MDC_IDC_MSMT_LEADCHNL_RV_SENSING_INTR_AMPL: 31.62
MDC_IDC_PG_IMPLANT_DTM: NORMAL
MDC_IDC_PG_MFG: NORMAL
MDC_IDC_PG_MODEL: NORMAL
MDC_IDC_PG_SERIAL: NORMAL
MDC_IDC_PG_TYPE: NORMAL
MDC_IDC_SESS_DTM: NORMAL
MDC_IDC_SESS_TYPE: NORMAL
MDC_IDC_SET_BRADY_AT_MODE_SWITCH_RATE: 171
MDC_IDC_SET_BRADY_LOWRATE: 60
MDC_IDC_SET_BRADY_MAX_SENSOR_RATE: 120
MDC_IDC_SET_BRADY_MAX_TRACKING_RATE: 130
MDC_IDC_SET_BRADY_MODE: NORMAL
MDC_IDC_SET_BRADY_PAV_DELAY: 170
MDC_IDC_SET_BRADY_SAV_DELAY: 140
MDC_IDC_SET_CRT_LVRV_DELAY: 0
MDC_IDC_SET_CRT_PACED_CHAMBERS: NORMAL
MDC_IDC_SET_LEADCHNL_LV_PACING_AMPLITUDE: 1.5
MDC_IDC_SET_LEADCHNL_LV_PACING_POLARITY: NORMAL
MDC_IDC_SET_LEADCHNL_LV_PACING_PULSEWIDTH: 1
MDC_IDC_SET_LEADCHNL_RA_PACING_AMPLITUDE: 1.5
MDC_IDC_SET_LEADCHNL_RA_PACING_POLARITY: NORMAL
MDC_IDC_SET_LEADCHNL_RA_PACING_PULSEWIDTH: 0.4
MDC_IDC_SET_LEADCHNL_RA_SENSING_POLARITY: NORMAL
MDC_IDC_SET_LEADCHNL_RA_SENSING_SENSITIVITY: 0.3
MDC_IDC_SET_LEADCHNL_RV_PACING_AMPLITUDE: 2
MDC_IDC_SET_LEADCHNL_RV_PACING_POLARITY: NORMAL
MDC_IDC_SET_LEADCHNL_RV_PACING_PULSEWIDTH: 0.4
MDC_IDC_SET_LEADCHNL_RV_SENSING_POLARITY: NORMAL
MDC_IDC_SET_LEADCHNL_RV_SENSING_SENSITIVITY: 0.45
MDC_IDC_SET_ZONE_STATUS: NORMAL
MDC_IDC_SET_ZONE_TYPE: NORMAL
MDC_IDC_STAT_AT_BURDEN_PERCENT: 0
MDC_IDC_STAT_TACHYTHERAPY_ATP_DELIVERED_RECENT: 0
MDC_IDC_STAT_TACHYTHERAPY_SHOCKS_ABORTED_RECENT: 0
MDC_IDC_STAT_TACHYTHERAPY_SHOCKS_DELIVERED_RECENT: 0

## 2025-08-20 ENCOUNTER — OFFICE VISIT (OUTPATIENT)
Dept: CARDIOLOGY | Age: 75
End: 2025-08-20
Payer: MEDICARE

## 2025-08-20 VITALS
BODY MASS INDEX: 39.75 KG/M2 | DIASTOLIC BLOOD PRESSURE: 68 MMHG | SYSTOLIC BLOOD PRESSURE: 102 MMHG | HEIGHT: 62 IN | WEIGHT: 216 LBS | HEART RATE: 63 BPM

## 2025-08-20 DIAGNOSIS — E78.2 MIXED HYPERLIPIDEMIA: ICD-10-CM

## 2025-08-20 DIAGNOSIS — I25.5 ISCHEMIC CARDIOMYOPATHY: ICD-10-CM

## 2025-08-20 DIAGNOSIS — I65.23 BILATERAL CAROTID ARTERY STENOSIS: ICD-10-CM

## 2025-08-20 DIAGNOSIS — I44.7 LEFT BUNDLE BRANCH BLOCK (LBBB): ICD-10-CM

## 2025-08-20 DIAGNOSIS — Z95.810 PRESENCE OF BIVENTRICULAR IMPLANTABLE CARDIOVERTER-DEFIBRILLATOR (ICD): ICD-10-CM

## 2025-08-20 DIAGNOSIS — I10 ESSENTIAL HYPERTENSION: ICD-10-CM

## 2025-08-20 DIAGNOSIS — I25.10 CORONARY ARTERY DISEASE INVOLVING NATIVE CORONARY ARTERY OF NATIVE HEART WITHOUT ANGINA PECTORIS: Primary | ICD-10-CM

## 2025-08-20 PROCEDURE — 3074F SYST BP LT 130 MM HG: CPT | Performed by: INTERNAL MEDICINE

## 2025-08-20 PROCEDURE — 1160F RVW MEDS BY RX/DR IN RCRD: CPT | Performed by: INTERNAL MEDICINE

## 2025-08-20 PROCEDURE — 1159F MED LIST DOCD IN RCRD: CPT | Performed by: INTERNAL MEDICINE

## 2025-08-20 PROCEDURE — 3078F DIAST BP <80 MM HG: CPT | Performed by: INTERNAL MEDICINE

## 2025-08-20 PROCEDURE — 99214 OFFICE O/P EST MOD 30 MIN: CPT | Performed by: INTERNAL MEDICINE

## 2025-08-20 PROCEDURE — 93000 ELECTROCARDIOGRAM COMPLETE: CPT | Performed by: INTERNAL MEDICINE

## 2025-08-20 RX ORDER — VALSARTAN AND HYDROCHLOROTHIAZIDE 160; 25 MG/1; MG/1
1 TABLET ORAL DAILY
Qty: 90 TABLET | Refills: 3 | Status: SHIPPED | OUTPATIENT
Start: 2025-08-20

## 2025-08-27 ENCOUNTER — HOSPITAL ENCOUNTER (OUTPATIENT)
Dept: CARDIOLOGY | Facility: HOSPITAL | Age: 75
Discharge: HOME | End: 2025-08-27
Admitting: INTERNAL MEDICINE
Payer: MEDICARE

## 2025-08-27 VITALS
BODY MASS INDEX: 39.75 KG/M2 | DIASTOLIC BLOOD PRESSURE: 78 MMHG | WEIGHT: 216 LBS | SYSTOLIC BLOOD PRESSURE: 118 MMHG | HEIGHT: 62 IN

## 2025-08-27 DIAGNOSIS — I25.5 ISCHEMIC CARDIOMYOPATHY: ICD-10-CM

## 2025-08-27 LAB
AORTIC ARCH: 2.1 CM
AORTIC DIMENSIONLESS INDEX: 0.91 (DI)
ASCENDING AORTA: 3.4 CM
AV MEAN PRESS GRAD SYS DOP V1V2: 3 MMHG
AV VMAX SYS DOP: 115 CM/SEC
BH CV ECHO MEAS - ACS: 1.7 CM
BH CV ECHO MEAS - AO MAX PG: 5.3 MMHG
BH CV ECHO MEAS - AO ROOT DIAM: 3.3 CM
BH CV ECHO MEAS - AO V2 VTI: 23.5 CM
BH CV ECHO MEAS - AVA(I,D): 2.8 CM2
BH CV ECHO MEAS - EDV(CUBED): 89.1 ML
BH CV ECHO MEAS - EDV(MOD-SP2): 146 ML
BH CV ECHO MEAS - EDV(MOD-SP4): 146 ML
BH CV ECHO MEAS - EF(MOD-SP2): 43.8 %
BH CV ECHO MEAS - EF(MOD-SP4): 41.8 %
BH CV ECHO MEAS - ESV(CUBED): 40.2 ML
BH CV ECHO MEAS - ESV(MOD-SP2): 82 ML
BH CV ECHO MEAS - ESV(MOD-SP4): 85 ML
BH CV ECHO MEAS - FS: 23.3 %
BH CV ECHO MEAS - IVS/LVPW: 1.11 CM
BH CV ECHO MEAS - IVSD: 1.06 CM
BH CV ECHO MEAS - LAT PEAK E' VEL: 9.5 CM/SEC
BH CV ECHO MEAS - LV DIASTOLIC VOL/BSA (35-75): 73.9 CM2
BH CV ECHO MEAS - LV MASS(C)D: 153 GRAMS
BH CV ECHO MEAS - LV MAX PG: 4.2 MMHG
BH CV ECHO MEAS - LV MEAN PG: 2 MMHG
BH CV ECHO MEAS - LV SYSTOLIC VOL/BSA (12-30): 43 CM2
BH CV ECHO MEAS - LV V1 MAX: 103 CM/SEC
BH CV ECHO MEAS - LV V1 VTI: 21.3 CM
BH CV ECHO MEAS - LVIDD: 4.5 CM
BH CV ECHO MEAS - LVIDS: 3.4 CM
BH CV ECHO MEAS - LVOT AREA: 3.1 CM2
BH CV ECHO MEAS - LVOT DIAM: 1.97 CM
BH CV ECHO MEAS - LVPWD: 0.95 CM
BH CV ECHO MEAS - MED PEAK E' VEL: 6.1 CM/SEC
BH CV ECHO MEAS - MR MAX PG: 117.9 MMHG
BH CV ECHO MEAS - MR MAX VEL: 542.9 CM/SEC
BH CV ECHO MEAS - MV A DUR: 0.13 SEC
BH CV ECHO MEAS - MV A MAX VEL: 109.4 CM/SEC
BH CV ECHO MEAS - MV DEC SLOPE: 173.6 CM/SEC2
BH CV ECHO MEAS - MV DEC TIME: 0.23 SEC
BH CV ECHO MEAS - MV E MAX VEL: 72 CM/SEC
BH CV ECHO MEAS - MV E/A: 0.66
BH CV ECHO MEAS - MV MAX PG: 4.1 MMHG
BH CV ECHO MEAS - MV MEAN PG: 1.38 MMHG
BH CV ECHO MEAS - MV P1/2T: 121.3 MSEC
BH CV ECHO MEAS - MV V2 VTI: 28.3 CM
BH CV ECHO MEAS - MVA(P1/2T): 1.81 CM2
BH CV ECHO MEAS - MVA(VTI): 2.3 CM2
BH CV ECHO MEAS - PA ACC TIME: 0.1 SEC
BH CV ECHO MEAS - PA V2 MAX: 148 CM/SEC
BH CV ECHO MEAS - PULM A REVS DUR: 0.14 SEC
BH CV ECHO MEAS - PULM A REVS VEL: 33.7 CM/SEC
BH CV ECHO MEAS - PULM DIAS VEL: 29.9 CM/SEC
BH CV ECHO MEAS - PULM S/D: 1.28
BH CV ECHO MEAS - PULM SYS VEL: 38.3 CM/SEC
BH CV ECHO MEAS - QP/QS: 1.13
BH CV ECHO MEAS - RAP SYSTOLE: 3 MMHG
BH CV ECHO MEAS - RV MAX PG: 6.1 MMHG
BH CV ECHO MEAS - RV V1 MAX: 123.2 CM/SEC
BH CV ECHO MEAS - RV V1 VTI: 27.4 CM
BH CV ECHO MEAS - RVOT DIAM: 1.85 CM
BH CV ECHO MEAS - SV(LVOT): 65 ML
BH CV ECHO MEAS - SV(MOD-SP2): 64 ML
BH CV ECHO MEAS - SV(MOD-SP4): 61 ML
BH CV ECHO MEAS - SV(RVOT): 73.8 ML
BH CV ECHO MEAS - SVI(LVOT): 32.9 ML/M2
BH CV ECHO MEAS - SVI(MOD-SP2): 32.4 ML/M2
BH CV ECHO MEAS - SVI(MOD-SP4): 30.9 ML/M2
BH CV ECHO MEAS - TAPSE (>1.6): 1.87 CM
BH CV ECHO MEASUREMENTS AVERAGE E/E' RATIO: 9.23
BH CV XLRA - RV BASE: 3.7 CM
BH CV XLRA - RV LENGTH: 7.4 CM
BH CV XLRA - TDI S': 8.7 CM/SEC
LEFT ATRIUM VOLUME INDEX: 17 ML/M2
LV EF BIPLANE MOD: 42.5 %
SINUS: 2.9 CM
STJ: 2.46 CM

## 2025-08-27 PROCEDURE — 25510000001 PERFLUTREN 6.52 MG/ML SUSPENSION 2 ML VIAL: Performed by: INTERNAL MEDICINE

## 2025-08-27 PROCEDURE — 93306 TTE W/DOPPLER COMPLETE: CPT

## 2025-08-27 RX ADMIN — PERFLUTREN 2 ML: 6.52 INJECTION, SUSPENSION INTRAVENOUS at 14:57

## (undated) DEVICE — CATH DIAG IMPULSE IMT 5F 100CM

## (undated) DEVICE — BALN CORNRY SINUS 6F 1X80CM

## (undated) DEVICE — Device

## (undated) DEVICE — INTRO SHEATH PRELUDE SNAP .038 7F 13CM W/SDPRT

## (undated) DEVICE — RADIFOCUS GLIDEWIRE: Brand: GLIDEWIRE

## (undated) DEVICE — CATH DS ATTAIN SELECT2 SUREVALVE TP/CRV 7F 130DEG 65CM LNG

## (undated) DEVICE — INTRO SHEATH PRELUDE SNAP .038 9F 13CM W/SDPRT BLK

## (undated) DEVICE — CATH VENT MIV RADL PIG ST TIP 5F 110CM

## (undated) DEVICE — Device: Brand: WEBSTER

## (undated) DEVICE — LOU PACE DEFIB: Brand: MEDLINE INDUSTRIES, INC.

## (undated) DEVICE — INTRO SHEATH PRELUDE SNAP .038 9.5F 13CM W/SDPRT GRY

## (undated) DEVICE — GLIDESHEATH SLENDER STAINLESS STEEL KIT: Brand: GLIDESHEATH SLENDER

## (undated) DEVICE — CATH GUIDE ATTAIN COMMND SURVLV MP9F50CM

## (undated) DEVICE — CATH DIAG IMPULSE FL4 5F 100CM

## (undated) DEVICE — PENCL E/S ULTRAVAC TELESCP NOSE HOLSTR 10FT

## (undated) DEVICE — CATH DIAG IMPULSE FR4 5F 100CM

## (undated) DEVICE — KT MANIFLD CARDIAC

## (undated) DEVICE — GW EMR FIX EXCHG J STD .035 3MM 260CM

## (undated) DEVICE — PK CATH CARD 40